# Patient Record
Sex: FEMALE | Race: WHITE | Employment: OTHER | URBAN - METROPOLITAN AREA
[De-identification: names, ages, dates, MRNs, and addresses within clinical notes are randomized per-mention and may not be internally consistent; named-entity substitution may affect disease eponyms.]

---

## 2017-01-01 ENCOUNTER — HOSPICE ADMISSION (OUTPATIENT)
Dept: HOSPICE | Facility: HOSPICE | Age: 82
End: 2017-01-01
Payer: MEDICARE

## 2017-01-01 ENCOUNTER — HOSPITAL ENCOUNTER (INPATIENT)
Age: 82
LOS: 4 days | Discharge: ANOTHER HOSPITAL | DRG: 871 | End: 2017-07-11
Attending: EMERGENCY MEDICINE | Admitting: INTERNAL MEDICINE
Payer: MEDICARE

## 2017-01-01 ENCOUNTER — HOSPITAL ENCOUNTER (OUTPATIENT)
Dept: GENERAL RADIOLOGY | Age: 82
Discharge: HOME | End: 2017-01-12
Attending: INTERNAL MEDICINE
Payer: MEDICARE

## 2017-01-01 ENCOUNTER — HOSPITAL ENCOUNTER (INPATIENT)
Age: 82
LOS: 2 days | End: 2017-07-17
Attending: INTERNAL MEDICINE | Admitting: INTERNAL MEDICINE

## 2017-01-01 ENCOUNTER — APPOINTMENT (OUTPATIENT)
Dept: CT IMAGING | Age: 82
DRG: 871 | End: 2017-01-01
Attending: INTERNAL MEDICINE
Payer: MEDICARE

## 2017-01-01 ENCOUNTER — APPOINTMENT (OUTPATIENT)
Dept: GENERAL RADIOLOGY | Age: 82
DRG: 871 | End: 2017-01-01
Attending: EMERGENCY MEDICINE
Payer: MEDICARE

## 2017-01-01 VITALS
HEART RATE: 95 BPM | TEMPERATURE: 98.4 F | BODY MASS INDEX: 29.25 KG/M2 | HEIGHT: 66 IN | DIASTOLIC BLOOD PRESSURE: 88 MMHG | WEIGHT: 182 LBS | SYSTOLIC BLOOD PRESSURE: 140 MMHG | OXYGEN SATURATION: 97 % | RESPIRATION RATE: 18 BRPM

## 2017-01-01 VITALS
DIASTOLIC BLOOD PRESSURE: 85 MMHG | HEART RATE: 163 BPM | RESPIRATION RATE: 26 BRPM | SYSTOLIC BLOOD PRESSURE: 130 MMHG | TEMPERATURE: 99 F

## 2017-01-01 DIAGNOSIS — A41.9 SEPSIS, DUE TO UNSPECIFIED ORGANISM: Primary | ICD-10-CM

## 2017-01-01 DIAGNOSIS — G93.40 ACUTE ENCEPHALOPATHY: ICD-10-CM

## 2017-01-01 DIAGNOSIS — I48.20 CHRONIC ATRIAL FIBRILLATION (HCC): Chronic | ICD-10-CM

## 2017-01-01 DIAGNOSIS — I10 ESSENTIAL HYPERTENSION: Chronic | ICD-10-CM

## 2017-01-01 DIAGNOSIS — N30.00 ACUTE CYSTITIS WITHOUT HEMATURIA: ICD-10-CM

## 2017-01-01 DIAGNOSIS — K21.9 GASTROESOPHAGEAL REFLUX DISEASE WITHOUT ESOPHAGITIS: Chronic | ICD-10-CM

## 2017-01-01 DIAGNOSIS — G47.33 OSA (OBSTRUCTIVE SLEEP APNEA): ICD-10-CM

## 2017-01-01 LAB
ALBUMIN SERPL BCP-MCNC: 2.4 G/DL (ref 3.2–4.6)
ALBUMIN SERPL BCP-MCNC: 2.8 G/DL (ref 3.2–4.6)
ALBUMIN/GLOB SERPL: 0.7 {RATIO} (ref 1.2–3.5)
ALBUMIN/GLOB SERPL: 0.7 {RATIO} (ref 1.2–3.5)
ALP SERPL-CCNC: 100 U/L (ref 50–136)
ALP SERPL-CCNC: 82 U/L (ref 50–136)
ALT SERPL-CCNC: 16 U/L (ref 12–65)
ALT SERPL-CCNC: 18 U/L (ref 12–65)
ANION GAP BLD CALC-SCNC: 11 MMOL/L (ref 7–16)
ANION GAP BLD CALC-SCNC: 11 MMOL/L (ref 7–16)
ANION GAP BLD CALC-SCNC: 8 MMOL/L (ref 7–16)
ANION GAP BLD CALC-SCNC: 9 MMOL/L (ref 7–16)
APPEARANCE UR: ABNORMAL
AST SERPL W P-5'-P-CCNC: 18 U/L (ref 15–37)
AST SERPL W P-5'-P-CCNC: 23 U/L (ref 15–37)
ATRIAL RATE: 416 BPM
BACTERIA SPEC CULT: ABNORMAL
BACTERIA SPEC CULT: NORMAL
BACTERIA SPEC CULT: NORMAL
BACTERIA URNS QL MICRO: ABNORMAL /HPF
BASOPHILS # BLD AUTO: 0 K/UL (ref 0–0.2)
BASOPHILS # BLD AUTO: 0 K/UL (ref 0–0.2)
BASOPHILS # BLD: 0 % (ref 0–2)
BASOPHILS # BLD: 0 % (ref 0–2)
BILIRUB SERPL-MCNC: 1 MG/DL (ref 0.2–1.1)
BILIRUB SERPL-MCNC: 1.5 MG/DL (ref 0.2–1.1)
BILIRUB UR QL: NEGATIVE
BNP SERPL-MCNC: 196 PG/ML
BUN SERPL-MCNC: 13 MG/DL (ref 8–23)
BUN SERPL-MCNC: 15 MG/DL (ref 8–23)
BUN SERPL-MCNC: 21 MG/DL (ref 8–23)
BUN SERPL-MCNC: 22 MG/DL (ref 8–23)
CALCIUM SERPL-MCNC: 7.8 MG/DL (ref 8.3–10.4)
CALCIUM SERPL-MCNC: 8.3 MG/DL (ref 8.3–10.4)
CALCIUM SERPL-MCNC: 8.5 MG/DL (ref 8.3–10.4)
CALCIUM SERPL-MCNC: 8.7 MG/DL (ref 8.3–10.4)
CALCULATED R AXIS, ECG10: 7 DEGREES
CALCULATED T AXIS, ECG11: -159 DEGREES
CASTS URNS QL MICRO: ABNORMAL /LPF
CHLORIDE SERPL-SCNC: 107 MMOL/L (ref 98–107)
CHLORIDE SERPL-SCNC: 108 MMOL/L (ref 98–107)
CHLORIDE SERPL-SCNC: 110 MMOL/L (ref 98–107)
CHLORIDE SERPL-SCNC: 111 MMOL/L (ref 98–107)
CK MB CFR SERPL CALC: 1.4 %
CK MB SERPL-MCNC: 1.5 NG/ML (ref 0.5–3.6)
CK SERPL-CCNC: 107 U/L (ref 21–215)
CO2 SERPL-SCNC: 23 MMOL/L (ref 21–32)
CO2 SERPL-SCNC: 25 MMOL/L (ref 21–32)
CO2 SERPL-SCNC: 26 MMOL/L (ref 21–32)
CO2 SERPL-SCNC: 27 MMOL/L (ref 21–32)
COLOR UR: YELLOW
CREAT SERPL-MCNC: 0.89 MG/DL (ref 0.6–1)
CREAT SERPL-MCNC: 0.98 MG/DL (ref 0.6–1)
CREAT SERPL-MCNC: 1.07 MG/DL (ref 0.6–1)
CREAT SERPL-MCNC: 1.15 MG/DL (ref 0.6–1)
DIAGNOSIS, 93000: NORMAL
DIFFERENTIAL METHOD BLD: ABNORMAL
DIFFERENTIAL METHOD BLD: ABNORMAL
EOSINOPHIL # BLD: 0 K/UL (ref 0–0.8)
EOSINOPHIL # BLD: 0.1 K/UL (ref 0–0.8)
EOSINOPHIL NFR BLD: 0 % (ref 0.5–7.8)
EOSINOPHIL NFR BLD: 1 % (ref 0.5–7.8)
EPI CELLS #/AREA URNS HPF: 0 /HPF
ERYTHROCYTE [DISTWIDTH] IN BLOOD BY AUTOMATED COUNT: 14.4 % (ref 11.9–14.6)
ERYTHROCYTE [DISTWIDTH] IN BLOOD BY AUTOMATED COUNT: 15 % (ref 11.9–14.6)
ERYTHROCYTE [DISTWIDTH] IN BLOOD BY AUTOMATED COUNT: 15.4 % (ref 11.9–14.6)
ERYTHROCYTE [DISTWIDTH] IN BLOOD BY AUTOMATED COUNT: 15.4 % (ref 11.9–14.6)
GLOBULIN SER CALC-MCNC: 3.6 G/DL (ref 2.3–3.5)
GLOBULIN SER CALC-MCNC: 3.9 G/DL (ref 2.3–3.5)
GLUCOSE BLD STRIP.AUTO-MCNC: 155 MG/DL (ref 65–100)
GLUCOSE SERPL-MCNC: 109 MG/DL (ref 65–100)
GLUCOSE SERPL-MCNC: 114 MG/DL (ref 65–100)
GLUCOSE SERPL-MCNC: 129 MG/DL (ref 65–100)
GLUCOSE SERPL-MCNC: 150 MG/DL (ref 65–100)
GLUCOSE UR STRIP.AUTO-MCNC: NEGATIVE MG/DL
HCT VFR BLD AUTO: 43 % (ref 35.8–46.3)
HCT VFR BLD AUTO: 45.1 % (ref 35.8–46.3)
HCT VFR BLD AUTO: 45.5 % (ref 35.8–46.3)
HCT VFR BLD AUTO: 46.2 % (ref 35.8–46.3)
HGB BLD-MCNC: 13.6 G/DL (ref 11.7–15.4)
HGB BLD-MCNC: 15 G/DL (ref 11.7–15.4)
HGB BLD-MCNC: 15.2 G/DL (ref 11.7–15.4)
HGB BLD-MCNC: 15.6 G/DL (ref 11.7–15.4)
HGB UR QL STRIP: ABNORMAL
IMM GRANULOCYTES # BLD: 0 K/UL (ref 0–0.5)
IMM GRANULOCYTES # BLD: 0.1 K/UL (ref 0–0.5)
IMM GRANULOCYTES NFR BLD AUTO: 0.3 % (ref 0–5)
IMM GRANULOCYTES NFR BLD AUTO: 0.6 % (ref 0–5)
KETONES UR QL STRIP.AUTO: NEGATIVE MG/DL
LACTATE BLD-SCNC: 1.7 MMOL/L (ref 0.5–1.9)
LEUKOCYTE ESTERASE UR QL STRIP.AUTO: ABNORMAL
LYMPHOCYTES # BLD AUTO: 5 % (ref 13–44)
LYMPHOCYTES # BLD AUTO: 9 % (ref 13–44)
LYMPHOCYTES # BLD: 0.7 K/UL (ref 0.5–4.6)
LYMPHOCYTES # BLD: 0.9 K/UL (ref 0.5–4.6)
MAGNESIUM SERPL-MCNC: 1.8 MG/DL (ref 1.8–2.4)
MCH RBC QN AUTO: 31.6 PG (ref 26.1–32.9)
MCH RBC QN AUTO: 32.1 PG (ref 26.1–32.9)
MCH RBC QN AUTO: 32.3 PG (ref 26.1–32.9)
MCH RBC QN AUTO: 32.6 PG (ref 26.1–32.9)
MCHC RBC AUTO-ENTMCNC: 31.6 G/DL (ref 31.4–35)
MCHC RBC AUTO-ENTMCNC: 33 G/DL (ref 31.4–35)
MCHC RBC AUTO-ENTMCNC: 33.7 G/DL (ref 31.4–35)
MCHC RBC AUTO-ENTMCNC: 33.8 G/DL (ref 31.4–35)
MCV RBC AUTO: 96 FL (ref 79.6–97.8)
MCV RBC AUTO: 96.5 FL (ref 79.6–97.8)
MCV RBC AUTO: 97.2 FL (ref 79.6–97.8)
MCV RBC AUTO: 99.8 FL (ref 79.6–97.8)
MM INDURATION POC: 0 MM (ref 0–5)
MM INDURATION POC: NORMAL MM (ref 0–5)
MM INDURATION POC: NORMAL MM (ref 0–5)
MONOCYTES # BLD: 0.9 K/UL (ref 0.1–1.3)
MONOCYTES # BLD: 1.7 K/UL (ref 0.1–1.3)
MONOCYTES NFR BLD AUTO: 14 % (ref 4–12)
MONOCYTES NFR BLD AUTO: 9 % (ref 4–12)
NEUTS SEG # BLD: 10 K/UL (ref 1.7–8.2)
NEUTS SEG # BLD: 8.1 K/UL (ref 1.7–8.2)
NEUTS SEG NFR BLD AUTO: 80 % (ref 43–78)
NEUTS SEG NFR BLD AUTO: 81 % (ref 43–78)
NITRITE UR QL STRIP.AUTO: NEGATIVE
PH UR STRIP: 5.5 [PH] (ref 5–9)
PLATELET # BLD AUTO: 106 K/UL (ref 150–450)
PLATELET # BLD AUTO: 111 K/UL (ref 150–450)
PLATELET # BLD AUTO: 114 K/UL (ref 150–450)
PLATELET # BLD AUTO: 119 K/UL (ref 150–450)
PMV BLD AUTO: 11.4 FL (ref 10.8–14.1)
PMV BLD AUTO: 12 FL (ref 10.8–14.1)
PMV BLD AUTO: 12 FL (ref 10.8–14.1)
PMV BLD AUTO: 12.3 FL (ref 10.8–14.1)
POTASSIUM SERPL-SCNC: 3.5 MMOL/L (ref 3.5–5.1)
POTASSIUM SERPL-SCNC: 3.9 MMOL/L (ref 3.5–5.1)
POTASSIUM SERPL-SCNC: 4.1 MMOL/L (ref 3.5–5.1)
POTASSIUM SERPL-SCNC: 4.5 MMOL/L (ref 3.5–5.1)
PPD POC: NEGATIVE NEGATIVE
PPD POC: NORMAL NEGATIVE
PPD POC: NORMAL NEGATIVE
PROCALCITONIN SERPL-MCNC: 0.5 NG/ML
PROT SERPL-MCNC: 6 G/DL (ref 6.3–8.2)
PROT SERPL-MCNC: 6.7 G/DL (ref 6.3–8.2)
PROT UR STRIP-MCNC: 100 MG/DL
Q-T INTERVAL, ECG07: 308 MS
QRS DURATION, ECG06: 78 MS
QTC CALCULATION (BEZET), ECG08: 446 MS
RBC # BLD AUTO: 4.31 M/UL (ref 4.05–5.25)
RBC # BLD AUTO: 4.68 M/UL (ref 4.05–5.25)
RBC # BLD AUTO: 4.7 M/UL (ref 4.05–5.25)
RBC # BLD AUTO: 4.79 M/UL (ref 4.05–5.25)
RBC #/AREA URNS HPF: ABNORMAL /HPF
SERVICE CMNT-IMP: ABNORMAL
SERVICE CMNT-IMP: NORMAL
SERVICE CMNT-IMP: NORMAL
SODIUM SERPL-SCNC: 142 MMOL/L (ref 136–145)
SODIUM SERPL-SCNC: 143 MMOL/L (ref 136–145)
SODIUM SERPL-SCNC: 145 MMOL/L (ref 136–145)
SODIUM SERPL-SCNC: 146 MMOL/L (ref 136–145)
SP GR UR REFRACTOMETRY: 1.01 (ref 1–1.02)
TROPONIN I BLD-MCNC: 0.03 NG/ML (ref 0–0.08)
TROPONIN I SERPL-MCNC: <0.02 NG/ML (ref 0.02–0.05)
UROBILINOGEN UR QL STRIP.AUTO: 0.2 EU/DL (ref 0.2–1)
VENTRICULAR RATE, ECG03: 126 BPM
WBC # BLD AUTO: 10 K/UL (ref 4.3–11.1)
WBC # BLD AUTO: 12.6 K/UL (ref 4.3–11.1)
WBC # BLD AUTO: 13.1 K/UL (ref 4.3–11.1)
WBC # BLD AUTO: 6.3 K/UL (ref 4.3–11.1)
WBC URNS QL MICRO: >100 /HPF

## 2017-01-01 PROCEDURE — 74011250637 HC RX REV CODE- 250/637: Performed by: INTERNAL MEDICINE

## 2017-01-01 PROCEDURE — 81001 URINALYSIS AUTO W/SCOPE: CPT | Performed by: EMERGENCY MEDICINE

## 2017-01-01 PROCEDURE — 74011000258 HC RX REV CODE- 258: Performed by: INTERNAL MEDICINE

## 2017-01-01 PROCEDURE — 85025 COMPLETE CBC W/AUTO DIFF WBC: CPT | Performed by: INTERNAL MEDICINE

## 2017-01-01 PROCEDURE — 74011250637 HC RX REV CODE- 250/637

## 2017-01-01 PROCEDURE — 70496 CT ANGIOGRAPHY HEAD: CPT

## 2017-01-01 PROCEDURE — 36415 COLL VENOUS BLD VENIPUNCTURE: CPT | Performed by: INTERNAL MEDICINE

## 2017-01-01 PROCEDURE — 74011250637 HC RX REV CODE- 250/637: Performed by: EMERGENCY MEDICINE

## 2017-01-01 PROCEDURE — 99285 EMERGENCY DEPT VISIT HI MDM: CPT | Performed by: EMERGENCY MEDICINE

## 2017-01-01 PROCEDURE — 70450 CT HEAD/BRAIN W/O DYE: CPT

## 2017-01-01 PROCEDURE — 84145 PROCALCITONIN (PCT): CPT | Performed by: EMERGENCY MEDICINE

## 2017-01-01 PROCEDURE — 93306 TTE W/DOPPLER COMPLETE: CPT

## 2017-01-01 PROCEDURE — 74011250636 HC RX REV CODE- 250/636: Performed by: INTERNAL MEDICINE

## 2017-01-01 PROCEDURE — 80048 BASIC METABOLIC PNL TOTAL CA: CPT | Performed by: INTERNAL MEDICINE

## 2017-01-01 PROCEDURE — 65660000000 HC RM CCU STEPDOWN

## 2017-01-01 PROCEDURE — 74011000258 HC RX REV CODE- 258: Performed by: EMERGENCY MEDICINE

## 2017-01-01 PROCEDURE — 93005 ELECTROCARDIOGRAM TRACING: CPT | Performed by: EMERGENCY MEDICINE

## 2017-01-01 PROCEDURE — 97530 THERAPEUTIC ACTIVITIES: CPT

## 2017-01-01 PROCEDURE — 80053 COMPREHEN METABOLIC PANEL: CPT | Performed by: EMERGENCY MEDICINE

## 2017-01-01 PROCEDURE — 74011000302 HC RX REV CODE- 302

## 2017-01-01 PROCEDURE — 87088 URINE BACTERIA CULTURE: CPT | Performed by: EMERGENCY MEDICINE

## 2017-01-01 PROCEDURE — 74011250636 HC RX REV CODE- 250/636: Performed by: NURSE PRACTITIONER

## 2017-01-01 PROCEDURE — 80053 COMPREHEN METABOLIC PANEL: CPT | Performed by: INTERNAL MEDICINE

## 2017-01-01 PROCEDURE — 87086 URINE CULTURE/COLONY COUNT: CPT | Performed by: EMERGENCY MEDICINE

## 2017-01-01 PROCEDURE — 3336500001 HSPC ELECTION

## 2017-01-01 PROCEDURE — 83605 ASSAY OF LACTIC ACID: CPT

## 2017-01-01 PROCEDURE — 0656 HSPC GENERAL INPATIENT

## 2017-01-01 PROCEDURE — 74011250637 HC RX REV CODE- 250/637: Performed by: HOSPITALIST

## 2017-01-01 PROCEDURE — 87186 SC STD MICRODIL/AGAR DIL: CPT | Performed by: EMERGENCY MEDICINE

## 2017-01-01 PROCEDURE — 74011000250 HC RX REV CODE- 250: Performed by: EMERGENCY MEDICINE

## 2017-01-01 PROCEDURE — 97161 PT EVAL LOW COMPLEX 20 MIN: CPT

## 2017-01-01 PROCEDURE — 74011000250 HC RX REV CODE- 250: Performed by: NURSE PRACTITIONER

## 2017-01-01 PROCEDURE — 71020 XR CHEST PA LAT: CPT

## 2017-01-01 PROCEDURE — 77030033269 HC SLV COMPR SCD KNE2 CARD -B

## 2017-01-01 PROCEDURE — 96374 THER/PROPH/DIAG INJ IV PUSH: CPT | Performed by: EMERGENCY MEDICINE

## 2017-01-01 PROCEDURE — 83880 ASSAY OF NATRIURETIC PEPTIDE: CPT | Performed by: EMERGENCY MEDICINE

## 2017-01-01 PROCEDURE — 74011250636 HC RX REV CODE- 250/636

## 2017-01-01 PROCEDURE — 84484 ASSAY OF TROPONIN QUANT: CPT | Performed by: INTERNAL MEDICINE

## 2017-01-01 PROCEDURE — 96361 HYDRATE IV INFUSION ADD-ON: CPT | Performed by: EMERGENCY MEDICINE

## 2017-01-01 PROCEDURE — 82962 GLUCOSE BLOOD TEST: CPT

## 2017-01-01 PROCEDURE — 82550 ASSAY OF CK (CPK): CPT | Performed by: INTERNAL MEDICINE

## 2017-01-01 PROCEDURE — 83735 ASSAY OF MAGNESIUM: CPT | Performed by: INTERNAL MEDICINE

## 2017-01-01 PROCEDURE — 85025 COMPLETE CBC W/AUTO DIFF WBC: CPT | Performed by: EMERGENCY MEDICINE

## 2017-01-01 PROCEDURE — 86580 TB INTRADERMAL TEST: CPT

## 2017-01-01 PROCEDURE — 84484 ASSAY OF TROPONIN QUANT: CPT

## 2017-01-01 PROCEDURE — 97165 OT EVAL LOW COMPLEX 30 MIN: CPT

## 2017-01-01 PROCEDURE — 85027 COMPLETE CBC AUTOMATED: CPT | Performed by: INTERNAL MEDICINE

## 2017-01-01 PROCEDURE — 74011250636 HC RX REV CODE- 250/636: Performed by: EMERGENCY MEDICINE

## 2017-01-01 PROCEDURE — 87040 BLOOD CULTURE FOR BACTERIA: CPT | Performed by: EMERGENCY MEDICINE

## 2017-01-01 PROCEDURE — 71010 XR CHEST PORT: CPT

## 2017-01-01 PROCEDURE — 74011636320 HC RX REV CODE- 636/320: Performed by: INTERNAL MEDICINE

## 2017-01-01 RX ORDER — SODIUM CHLORIDE 0.9 % (FLUSH) 0.9 %
3 SYRINGE (ML) INJECTION EVERY 12 HOURS
Status: DISCONTINUED | OUTPATIENT
Start: 2017-01-01 | End: 2017-01-01

## 2017-01-01 RX ORDER — SODIUM CHLORIDE 0.9 % (FLUSH) 0.9 %
5-10 SYRINGE (ML) INJECTION AS NEEDED
Status: DISCONTINUED | OUTPATIENT
Start: 2017-01-01 | End: 2017-01-01 | Stop reason: HOSPADM

## 2017-01-01 RX ORDER — NAPROXEN SODIUM 220 MG
220 TABLET ORAL 2 TIMES DAILY WITH MEALS
COMMUNITY

## 2017-01-01 RX ORDER — ACETAMINOPHEN 325 MG/1
650 TABLET ORAL
COMMUNITY

## 2017-01-01 RX ORDER — SODIUM CHLORIDE 9 MG/ML
75 INJECTION, SOLUTION INTRAVENOUS CONTINUOUS
Status: DISCONTINUED | OUTPATIENT
Start: 2017-01-01 | End: 2017-01-01

## 2017-01-01 RX ORDER — ACETAMINOPHEN 500 MG
1000 TABLET ORAL
Status: COMPLETED | OUTPATIENT
Start: 2017-01-01 | End: 2017-01-01

## 2017-01-01 RX ORDER — LEVOTHYROXINE SODIUM 125 UG/1
125 TABLET ORAL
Status: DISCONTINUED | OUTPATIENT
Start: 2017-01-01 | End: 2017-01-01 | Stop reason: HOSPADM

## 2017-01-01 RX ORDER — LORAZEPAM 2 MG/ML
1 INJECTION INTRAMUSCULAR
Status: DISCONTINUED | OUTPATIENT
Start: 2017-01-01 | End: 2017-01-01

## 2017-01-01 RX ORDER — MULTIVITAMIN
1 TABLET ORAL DAILY
COMMUNITY

## 2017-01-01 RX ORDER — FACIAL-BODY WIPES
10 EACH TOPICAL AS NEEDED
Status: DISCONTINUED | OUTPATIENT
Start: 2017-01-01 | End: 2017-01-01 | Stop reason: HOSPADM

## 2017-01-01 RX ORDER — HEPARIN SODIUM 5000 [USP'U]/ML
5000 INJECTION, SOLUTION INTRAVENOUS; SUBCUTANEOUS EVERY 8 HOURS
Status: CANCELLED | OUTPATIENT
Start: 2017-01-01

## 2017-01-01 RX ORDER — DEXTROMETHORPHAN HYDROBROMIDE, GUAIFENESIN 5; 100 MG/5ML; MG/5ML
LIQUID ORAL 3 TIMES DAILY
COMMUNITY
End: 2017-01-01

## 2017-01-01 RX ORDER — SODIUM CHLORIDE 0.9 % (FLUSH) 0.9 %
10 SYRINGE (ML) INJECTION
Status: COMPLETED | OUTPATIENT
Start: 2017-01-01 | End: 2017-01-01

## 2017-01-01 RX ORDER — SODIUM CHLORIDE 0.9 % (FLUSH) 0.9 %
3 SYRINGE (ML) INJECTION AS NEEDED
Status: DISCONTINUED | OUTPATIENT
Start: 2017-01-01 | End: 2017-01-01

## 2017-01-01 RX ORDER — MORPHINE SULFATE 2 MG/ML
2 INJECTION, SOLUTION INTRAMUSCULAR; INTRAVENOUS
Status: DISCONTINUED | OUTPATIENT
Start: 2017-01-01 | End: 2017-01-01 | Stop reason: HOSPADM

## 2017-01-01 RX ORDER — ASCORBIC ACID 500 MG
1000 TABLET ORAL DAILY
Status: DISCONTINUED | OUTPATIENT
Start: 2017-01-01 | End: 2017-01-01 | Stop reason: HOSPADM

## 2017-01-01 RX ORDER — NITROFURANTOIN MACROCRYSTALS 50 MG/1
50 CAPSULE ORAL EVERY 6 HOURS
Status: DISCONTINUED | OUTPATIENT
Start: 2017-01-01 | End: 2017-01-01 | Stop reason: HOSPADM

## 2017-01-01 RX ORDER — METOPROLOL SUCCINATE 25 MG/1
25 TABLET, EXTENDED RELEASE ORAL DAILY
COMMUNITY

## 2017-01-01 RX ORDER — METOPROLOL SUCCINATE 50 MG/1
50 TABLET, EXTENDED RELEASE ORAL DAILY
Status: DISCONTINUED | OUTPATIENT
Start: 2017-01-01 | End: 2017-01-01 | Stop reason: HOSPADM

## 2017-01-01 RX ORDER — MORPHINE SULFATE 2 MG/ML
2 INJECTION, SOLUTION INTRAMUSCULAR; INTRAVENOUS
Status: DISCONTINUED | OUTPATIENT
Start: 2017-01-01 | End: 2017-01-01

## 2017-01-01 RX ORDER — MEMANTINE HYDROCHLORIDE 5 MG/1
10 TABLET ORAL DAILY
Status: DISCONTINUED | OUTPATIENT
Start: 2017-01-01 | End: 2017-01-01 | Stop reason: HOSPADM

## 2017-01-01 RX ORDER — HALOPERIDOL 5 MG/ML
2 INJECTION INTRAMUSCULAR
Status: DISCONTINUED | OUTPATIENT
Start: 2017-01-01 | End: 2017-01-01

## 2017-01-01 RX ORDER — LORAZEPAM 2 MG/ML
1 INJECTION INTRAMUSCULAR
Status: DISCONTINUED | OUTPATIENT
Start: 2017-01-01 | End: 2017-01-01 | Stop reason: HOSPADM

## 2017-01-01 RX ORDER — IBUPROFEN 800 MG/1
800 TABLET ORAL
COMMUNITY

## 2017-01-01 RX ORDER — IPRATROPIUM BROMIDE AND ALBUTEROL SULFATE 2.5; .5 MG/3ML; MG/3ML
3 SOLUTION RESPIRATORY (INHALATION)
Status: DISCONTINUED | OUTPATIENT
Start: 2017-01-01 | End: 2017-01-01 | Stop reason: HOSPADM

## 2017-01-01 RX ORDER — ACETAMINOPHEN 325 MG/1
325 TABLET ORAL
Status: DISCONTINUED | OUTPATIENT
Start: 2017-01-01 | End: 2017-01-01 | Stop reason: HOSPADM

## 2017-01-01 RX ORDER — LEVOTHYROXINE SODIUM 100 UG/1
100 TABLET ORAL
COMMUNITY

## 2017-01-01 RX ORDER — HALOPERIDOL 5 MG/ML
2 INJECTION INTRAMUSCULAR
Status: DISCONTINUED | OUTPATIENT
Start: 2017-01-01 | End: 2017-01-01 | Stop reason: HOSPADM

## 2017-01-01 RX ORDER — DILTIAZEM HYDROCHLORIDE 5 MG/ML
5 INJECTION INTRAVENOUS ONCE
Status: COMPLETED | OUTPATIENT
Start: 2017-01-01 | End: 2017-01-01

## 2017-01-01 RX ORDER — SODIUM CHLORIDE 9 MG/ML
150 INJECTION, SOLUTION INTRAVENOUS CONTINUOUS
Status: DISCONTINUED | OUTPATIENT
Start: 2017-01-01 | End: 2017-01-01 | Stop reason: HOSPADM

## 2017-01-01 RX ORDER — GLYCOPYRROLATE 0.2 MG/ML
0.2 INJECTION INTRAMUSCULAR; INTRAVENOUS
Status: DISCONTINUED | OUTPATIENT
Start: 2017-01-01 | End: 2017-01-01 | Stop reason: HOSPADM

## 2017-01-01 RX ORDER — FERROUS GLUCONATE 324(38)MG
1 TABLET ORAL DAILY
Status: DISCONTINUED | OUTPATIENT
Start: 2017-01-01 | End: 2017-01-01 | Stop reason: HOSPADM

## 2017-01-01 RX ORDER — SODIUM CHLORIDE 0.9 % (FLUSH) 0.9 %
5 SYRINGE (ML) INJECTION EVERY 8 HOURS
Status: DISCONTINUED | OUTPATIENT
Start: 2017-01-01 | End: 2017-01-01 | Stop reason: HOSPADM

## 2017-01-01 RX ORDER — LORAZEPAM 2 MG/ML
2 INJECTION INTRAMUSCULAR
Status: DISCONTINUED | OUTPATIENT
Start: 2017-01-01 | End: 2017-01-01 | Stop reason: HOSPADM

## 2017-01-01 RX ORDER — ACETAMINOPHEN 650 MG/1
650 SUPPOSITORY RECTAL
Status: DISCONTINUED | OUTPATIENT
Start: 2017-01-01 | End: 2017-01-01 | Stop reason: HOSPADM

## 2017-01-01 RX ORDER — DIPHENOXYLATE HYDROCHLORIDE AND ATROPINE SULFATE 2.5; .025 MG/1; MG/1
1 TABLET ORAL
COMMUNITY

## 2017-01-01 RX ORDER — DILTIAZEM HYDROCHLORIDE 120 MG/1
120 CAPSULE, COATED, EXTENDED RELEASE ORAL DAILY
COMMUNITY

## 2017-01-01 RX ORDER — DOCUSATE SODIUM 100 MG/1
100 CAPSULE, LIQUID FILLED ORAL 2 TIMES DAILY
Status: DISCONTINUED | OUTPATIENT
Start: 2017-01-01 | End: 2017-01-01 | Stop reason: HOSPADM

## 2017-01-01 RX ORDER — LORAZEPAM 2 MG/ML
2 INJECTION INTRAMUSCULAR
Status: DISCONTINUED | OUTPATIENT
Start: 2017-01-01 | End: 2017-01-01

## 2017-01-01 RX ORDER — METOPROLOL SUCCINATE 25 MG/1
25 TABLET, EXTENDED RELEASE ORAL DAILY
Status: DISCONTINUED | OUTPATIENT
Start: 2017-01-01 | End: 2017-01-01

## 2017-01-01 RX ADMIN — FERROUS GLUCONATE 1 TABLET: 324 TABLET ORAL at 09:40

## 2017-01-01 RX ADMIN — OXYCODONE HYDROCHLORIDE AND ACETAMINOPHEN 1000 MG: 500 TABLET ORAL at 09:35

## 2017-01-01 RX ADMIN — HALOPERIDOL LACTATE 2 MG: 5 INJECTION, SOLUTION INTRAMUSCULAR at 09:51

## 2017-01-01 RX ADMIN — SODIUM CHLORIDE 75 ML/HR: 900 INJECTION, SOLUTION INTRAVENOUS at 00:00

## 2017-01-01 RX ADMIN — Medication 5 ML: at 04:58

## 2017-01-01 RX ADMIN — SODIUM CHLORIDE 125 ML/HR: 900 INJECTION, SOLUTION INTRAVENOUS at 05:45

## 2017-01-01 RX ADMIN — MORPHINE SULFATE 2 MG: 2 INJECTION, SOLUTION INTRAMUSCULAR; INTRAVENOUS at 20:29

## 2017-01-01 RX ADMIN — SODIUM CHLORIDE 150 ML/HR: 900 INJECTION, SOLUTION INTRAVENOUS at 10:53

## 2017-01-01 RX ADMIN — MORPHINE SULFATE 2 MG: 2 INJECTION, SOLUTION INTRAMUSCULAR; INTRAVENOUS at 08:07

## 2017-01-01 RX ADMIN — MORPHINE SULFATE 2 MG: 2 INJECTION, SOLUTION INTRAMUSCULAR; INTRAVENOUS at 15:14

## 2017-01-01 RX ADMIN — DOCUSATE SODIUM 100 MG: 100 CAPSULE, LIQUID FILLED ORAL at 21:25

## 2017-01-01 RX ADMIN — METOPROLOL SUCCINATE 50 MG: 50 TABLET, EXTENDED RELEASE ORAL at 07:54

## 2017-01-01 RX ADMIN — DOCUSATE SODIUM 100 MG: 100 CAPSULE, LIQUID FILLED ORAL at 07:55

## 2017-01-01 RX ADMIN — SODIUM CHLORIDE 100 ML: 900 INJECTION, SOLUTION INTRAVENOUS at 11:30

## 2017-01-01 RX ADMIN — MEMANTINE HYDROCHLORIDE 10 MG: 5 TABLET ORAL at 07:55

## 2017-01-01 RX ADMIN — SODIUM CHLORIDE, PRESERVATIVE FREE 3 ML: 5 INJECTION INTRAVENOUS at 11:36

## 2017-01-01 RX ADMIN — NITROFURANTOIN MACROCRYSTALS 50 MG: 50 CAPSULE ORAL at 17:53

## 2017-01-01 RX ADMIN — Medication 10 ML: at 11:30

## 2017-01-01 RX ADMIN — Medication 5 ML: at 13:10

## 2017-01-01 RX ADMIN — MEROPENEM 500 MG: 500 INJECTION, POWDER, FOR SOLUTION INTRAVENOUS at 21:24

## 2017-01-01 RX ADMIN — MORPHINE SULFATE 2 MG: 2 INJECTION, SOLUTION INTRAMUSCULAR; INTRAVENOUS at 09:52

## 2017-01-01 RX ADMIN — Medication 5 ML: at 21:35

## 2017-01-01 RX ADMIN — MEROPENEM 500 MG: 500 INJECTION, POWDER, FOR SOLUTION INTRAVENOUS at 20:03

## 2017-01-01 RX ADMIN — LORAZEPAM 1 MG: 2 INJECTION INTRAMUSCULAR; INTRAVENOUS at 18:09

## 2017-01-01 RX ADMIN — HALOPERIDOL LACTATE 2 MG: 5 INJECTION, SOLUTION INTRAMUSCULAR at 16:47

## 2017-01-01 RX ADMIN — LORAZEPAM 1 MG: 2 INJECTION INTRAMUSCULAR; INTRAVENOUS at 15:15

## 2017-01-01 RX ADMIN — Medication 5 ML: at 17:54

## 2017-01-01 RX ADMIN — FERROUS GLUCONATE 1 TABLET: 324 TABLET ORAL at 07:55

## 2017-01-01 RX ADMIN — MORPHINE SULFATE 2 MG: 2 INJECTION, SOLUTION INTRAMUSCULAR; INTRAVENOUS at 06:17

## 2017-01-01 RX ADMIN — NITROFURANTOIN MACROCRYSTALS 50 MG: 50 CAPSULE ORAL at 15:31

## 2017-01-01 RX ADMIN — MEROPENEM 500 MG: 500 INJECTION, POWDER, FOR SOLUTION INTRAVENOUS at 13:59

## 2017-01-01 RX ADMIN — MORPHINE SULFATE 2 MG: 2 INJECTION, SOLUTION INTRAMUSCULAR; INTRAVENOUS at 12:26

## 2017-01-01 RX ADMIN — MEMANTINE HYDROCHLORIDE 10 MG: 5 TABLET ORAL at 09:37

## 2017-01-01 RX ADMIN — MORPHINE SULFATE 2 MG: 2 INJECTION, SOLUTION INTRAMUSCULAR; INTRAVENOUS at 18:07

## 2017-01-01 RX ADMIN — DILTIAZEM HYDROCHLORIDE 5 MG: 5 INJECTION INTRAVENOUS at 16:29

## 2017-01-01 RX ADMIN — DOCUSATE SODIUM 100 MG: 100 CAPSULE, LIQUID FILLED ORAL at 09:37

## 2017-01-01 RX ADMIN — SODIUM CHLORIDE 1000 ML: 900 INJECTION, SOLUTION INTRAVENOUS at 15:57

## 2017-01-01 RX ADMIN — MORPHINE SULFATE 2 MG: 2 INJECTION, SOLUTION INTRAMUSCULAR; INTRAVENOUS at 00:22

## 2017-01-01 RX ADMIN — HALOPERIDOL LACTATE 2 MG: 5 INJECTION, SOLUTION INTRAMUSCULAR at 17:47

## 2017-01-01 RX ADMIN — MORPHINE SULFATE 2 MG: 2 INJECTION, SOLUTION INTRAMUSCULAR; INTRAVENOUS at 14:53

## 2017-01-01 RX ADMIN — MORPHINE SULFATE 2 MG: 2 INJECTION, SOLUTION INTRAMUSCULAR; INTRAVENOUS at 16:47

## 2017-01-01 RX ADMIN — MORPHINE SULFATE 2 MG: 2 INJECTION, SOLUTION INTRAMUSCULAR; INTRAVENOUS at 14:17

## 2017-01-01 RX ADMIN — MORPHINE SULFATE 2 MG: 2 INJECTION, SOLUTION INTRAMUSCULAR; INTRAVENOUS at 18:12

## 2017-01-01 RX ADMIN — DOCUSATE SODIUM 100 MG: 100 CAPSULE, LIQUID FILLED ORAL at 17:53

## 2017-01-01 RX ADMIN — MORPHINE SULFATE 2 MG: 2 INJECTION, SOLUTION INTRAMUSCULAR; INTRAVENOUS at 21:02

## 2017-01-01 RX ADMIN — GLYCOPYRROLATE 0.2 MG: 0.2 INJECTION INTRAMUSCULAR; INTRAVENOUS at 14:53

## 2017-01-01 RX ADMIN — LEVOTHYROXINE SODIUM 125 MCG: 125 TABLET ORAL at 05:18

## 2017-01-01 RX ADMIN — METOPROLOL SUCCINATE 50 MG: 50 TABLET, EXTENDED RELEASE ORAL at 05:51

## 2017-01-01 RX ADMIN — TOBRAMYCIN SULFATE 340 MG: 40 INJECTION, SOLUTION INTRAMUSCULAR; INTRAVENOUS at 18:00

## 2017-01-01 RX ADMIN — NITROFURANTOIN MACROCRYSTALS 50 MG: 50 CAPSULE ORAL at 05:17

## 2017-01-01 RX ADMIN — HALOPERIDOL LACTATE 2 MG: 5 INJECTION, SOLUTION INTRAMUSCULAR at 14:53

## 2017-01-01 RX ADMIN — MORPHINE SULFATE 2 MG: 2 INJECTION, SOLUTION INTRAMUSCULAR; INTRAVENOUS at 17:47

## 2017-01-01 RX ADMIN — METOPROLOL SUCCINATE 25 MG: 25 TABLET, EXTENDED RELEASE ORAL at 09:40

## 2017-01-01 RX ADMIN — Medication 5 ML: at 05:44

## 2017-01-01 RX ADMIN — ACETAMINOPHEN 1000 MG: 500 TABLET ORAL at 16:33

## 2017-01-01 RX ADMIN — Medication 5 ML: at 05:52

## 2017-01-01 RX ADMIN — Medication 5 ML: at 09:40

## 2017-01-01 RX ADMIN — Medication 5 ML: at 20:59

## 2017-01-01 RX ADMIN — Medication 5 ML: at 21:55

## 2017-01-01 RX ADMIN — TUBERCULIN PURIFIED PROTEIN DERIVATIVE 5 UNITS: 5 INJECTION, SOLUTION INTRADERMAL at 21:25

## 2017-01-01 RX ADMIN — MORPHINE SULFATE 2 MG: 2 INJECTION, SOLUTION INTRAMUSCULAR; INTRAVENOUS at 18:36

## 2017-01-01 RX ADMIN — HALOPERIDOL LACTATE 2 MG: 5 INJECTION, SOLUTION INTRAMUSCULAR at 08:06

## 2017-01-01 RX ADMIN — LEVOTHYROXINE SODIUM 125 MCG: 125 TABLET ORAL at 04:59

## 2017-01-01 RX ADMIN — FERROUS GLUCONATE 1 TABLET: 324 TABLET ORAL at 09:37

## 2017-01-01 RX ADMIN — MORPHINE SULFATE 2 MG: 2 INJECTION, SOLUTION INTRAMUSCULAR; INTRAVENOUS at 21:35

## 2017-01-01 RX ADMIN — MEROPENEM 500 MG: 500 INJECTION, POWDER, FOR SOLUTION INTRAVENOUS at 04:21

## 2017-01-01 RX ADMIN — NITROFURANTOIN MACROCRYSTALS 50 MG: 50 CAPSULE ORAL at 00:54

## 2017-01-01 RX ADMIN — MORPHINE SULFATE 2 MG: 2 INJECTION, SOLUTION INTRAMUSCULAR; INTRAVENOUS at 02:55

## 2017-01-01 RX ADMIN — NITROFURANTOIN MACROCRYSTALS 50 MG: 50 CAPSULE ORAL at 23:45

## 2017-01-01 RX ADMIN — MORPHINE SULFATE 2 MG: 2 INJECTION, SOLUTION INTRAMUSCULAR; INTRAVENOUS at 07:40

## 2017-01-01 RX ADMIN — OXYCODONE HYDROCHLORIDE AND ACETAMINOPHEN 1000 MG: 500 TABLET ORAL at 09:39

## 2017-01-01 RX ADMIN — MORPHINE SULFATE 2 MG: 2 INJECTION, SOLUTION INTRAMUSCULAR; INTRAVENOUS at 20:06

## 2017-01-01 RX ADMIN — ACETAMINOPHEN 325 MG: 325 TABLET ORAL at 19:56

## 2017-01-01 RX ADMIN — MORPHINE SULFATE 2 MG: 2 INJECTION, SOLUTION INTRAMUSCULAR; INTRAVENOUS at 11:38

## 2017-01-01 RX ADMIN — IOPAMIDOL 80 ML: 755 INJECTION, SOLUTION INTRAVENOUS at 11:30

## 2017-01-01 RX ADMIN — MEMANTINE HYDROCHLORIDE 10 MG: 5 TABLET ORAL at 09:39

## 2017-01-01 RX ADMIN — NITROFURANTOIN MACROCRYSTALS 50 MG: 50 CAPSULE ORAL at 21:55

## 2017-01-01 RX ADMIN — DOCUSATE SODIUM 100 MG: 100 CAPSULE, LIQUID FILLED ORAL at 09:39

## 2017-01-01 RX ADMIN — SODIUM CHLORIDE 125 ML/HR: 900 INJECTION, SOLUTION INTRAVENOUS at 19:25

## 2017-01-01 RX ADMIN — MEROPENEM 500 MG: 500 INJECTION, POWDER, FOR SOLUTION INTRAVENOUS at 04:19

## 2017-01-01 RX ADMIN — Medication 5 ML: at 21:25

## 2017-01-01 RX ADMIN — LEVOTHYROXINE SODIUM 125 MCG: 125 TABLET ORAL at 05:52

## 2017-01-01 RX ADMIN — OXYCODONE HYDROCHLORIDE AND ACETAMINOPHEN 1000 MG: 500 TABLET ORAL at 07:54

## 2017-01-01 RX ADMIN — NITROFURANTOIN MACROCRYSTALS 50 MG: 50 CAPSULE ORAL at 11:56

## 2017-01-01 RX ADMIN — MORPHINE SULFATE 2 MG: 2 INJECTION, SOLUTION INTRAMUSCULAR; INTRAVENOUS at 22:14

## 2017-01-01 RX ADMIN — MORPHINE SULFATE 2 MG: 2 INJECTION, SOLUTION INTRAMUSCULAR; INTRAVENOUS at 12:03

## 2017-01-01 RX ADMIN — HALOPERIDOL LACTATE 2 MG: 5 INJECTION, SOLUTION INTRAMUSCULAR at 12:26

## 2017-01-01 RX ADMIN — NITROFURANTOIN MACROCRYSTALS 50 MG: 50 CAPSULE ORAL at 05:05

## 2017-01-01 RX ADMIN — Medication 5 ML: at 05:17

## 2017-01-01 RX ADMIN — LEVOTHYROXINE SODIUM 125 MCG: 125 TABLET ORAL at 06:00

## 2017-01-01 RX ADMIN — MORPHINE SULFATE 2 MG: 2 INJECTION, SOLUTION INTRAMUSCULAR; INTRAVENOUS at 16:20

## 2017-01-18 PROBLEM — Z86.73 HX OF ARTERIAL ISCHEMIC STROKE: Status: ACTIVE | Noted: 2017-01-01

## 2017-02-22 PROBLEM — N39.0 RECURRENT UTI: Status: ACTIVE | Noted: 2017-01-01

## 2017-05-30 PROBLEM — N32.81 OAB (OVERACTIVE BLADDER): Status: ACTIVE | Noted: 2017-01-01

## 2017-05-30 PROBLEM — F01.50 VASCULAR DEMENTIA WITHOUT BEHAVIORAL DISTURBANCE (HCC): Status: ACTIVE | Noted: 2017-01-01

## 2017-05-30 PROBLEM — I25.10 ASCVD (ARTERIOSCLEROTIC CARDIOVASCULAR DISEASE): Status: ACTIVE | Noted: 2017-01-01

## 2017-07-07 PROBLEM — N39.0 UTI (URINARY TRACT INFECTION): Status: ACTIVE | Noted: 2017-01-01

## 2017-07-07 PROBLEM — I48.91 ATRIAL FIBRILLATION WITH CONTROLLED VENTRICULAR RESPONSE (HCC): Status: ACTIVE | Noted: 2017-01-01

## 2017-07-07 PROBLEM — A41.9 SEPSIS (HCC): Status: ACTIVE | Noted: 2017-01-01

## 2017-07-07 NOTE — PROGRESS NOTES
Assumed care of patient. Assessment completed and documented, see docflow. Patient arrived to room 803 via stretcher, accompanied by transport and family. Patient A/Ox3, oriented to room, staff and surroundings. Patient denies pain. NAD noted at present. Respirations even and non labored. Care giver at bedside verbalized understanding to call for needs. Call light within reach. Will continue to monitor.

## 2017-07-07 NOTE — H&P
HOSPITALIST H&P  NAME:  Gabby Rain   Age:  80 y.o.  :   4/3/1931   MRN:   [id number]  PCP: Mita Collier MD  Treatment Team: Attending Provider: Paris Knowles MD  HPI:   Gabby Rain is a 80 y.o. female that presented to the ED with 1 week history of weakness, mild confusion, urinary incontinence, diaphoresis, nausea and lower abdominal pain. ED evaluation detailed below shows sepsis UTI. She initially had A-fib RVR which has improved with IVF hydration and Cardizem. Urine culture from 17 shows MDR Klebsiella oxytoca. The hospitalist have been asked to admit. Results summary of Diagnostic Studies/Procedures copied from within [facility] EMR:  CXR  IMPRESSION: No acute cardiopulmonary abnormality.     Complete ROS done and is as stated in HPI or otherwise negative  Past Medical History:   Diagnosis Date    Abnormal cardiovascular function study     Abnormal EKG     Atrial dilatation, bilateral     echo 2016    Bradycardia 2016    Bruit (arterial) 2016    CAD (coronary artery disease) 2009    S/p PTCA and stent to mid-lad (2008)     Cancer ([address] Distant past    skin ca on nose years ago    Carotid stenosis     Chest pain 2016    Chronic atrial fibrillation ([address] 2016    PREVIOUSLY ON COUMADIN    Chronic constipation 7/10/2012    Chronic interstitial cystitis 2014    Followed by Dr. Haskins [place] Cyst of left kidney     Debility 2014    Diverticulosis     Edema 2016    Falls     GERD (gastroesophageal reflux disease) 2013    H/O hiatal hernia     Hemorrhage of rectum and anus 2013    RECTAL BLEEDING      Hip fracture ([address]     History of colon polyps     History of diverticulitis     History of kidney infection     History of nephritis [id number]    [facility]    History of stroke 2016    left basal ganglia    History of torn meniscus of left knee     2010, Dr Hightower  HTN (hypertension) 5/27/2009    Hyperlipidemia, mixed     Hypothyroid 2000    LVH (left ventricular hypertrophy)     Last echo 7/11/2016,     Malaise and fatigue 5/4/2016    Melanoma in situ of face ([address] 08/2016    left cheek    Mitral valve regurgitation     echo done 7/11/2016, mild annular calcification    Myalgia 5/4/2016    Nephrolithiasis 1966    one kidney stone 1966-passed on own    FERNANDA on CPAP 1998    Dr Yoselin Robertson S/P colon resection 7/20/2014 2/28/2014 s/p lap sigmoid resection for diverticular disease Dr. Zhao Anderson's ring 6/16/2010    Skull fracture ([address] 1/24/2016 2014 2 TO FALL, CLOSED HEAD INJURY    Sleep apnea 5/4/2016    Stroke (cerebrum) ([address]     Subdural bleeding ([address] 1/24/2016 2014 DUE TO HEAD INJURY WITH SKULL FX    Subendocardial infarction, subsequent episode of care ([address] 5/4/2016    Unstable gait 1/24/2016    Vertigo     Vitamin B 12 deficiency     Yeast UTI 7/22/2014      Past Surgical History:   Procedure Laterality Date    HX APPENDECTOMY  1965    HX CATARACT REMOVAL  2010    bilat.  Dr gil    HX COLECTOMY  2/2014    12\" removed    HX COLONOSCOPY  2014    Dr Jona Rico  2007    1 stent, D Siachos    HX GYN  12/2010    DR Metz, GYN biopsy    HX HIP FRACTURE TX Right     ORIF 7/8/2014 - [place]    HX MALIGNANT SKIN LESION EXCISION Left 9/2015    cheek, MOHS, DR Cecelia Mccurdy    HX ORTHOPAEDIC      heel; finger    HX ORTHOPAEDIC Right 1999    foot, plantar fascitis nerve surgery, dr Vadim Toribio Right 2014    hip    HX ORTHOPAEDIC  1/2003    Trigger finger release, Dr Nerissa Barrow      biopsies, female    HX OTHER SURGICAL      Plantar Fasciitis    HX OTHER SURGICAL  1998    blayne edwards, Dr Therese Griffiths HX PTCA  2007 [place], [facility] UROLOGICAL  2011    Cystoscopy, Dr Ruddy Helton vascular ligation-geraldo 1966-varicose veins      Allergies   Allergen Reactions    Candida Albicans Skin Test, Std Other (comments)     Sinus infection-dried fruits, anything made with fungus    Ciprofloxacin Swelling and Shortness of Breath     Lips swell/red face    Codeine Hives, Other (comments) and Shortness of Breath     Other reaction(s): Headache-I  Gives whelps    Erythromycin Shortness of Breath    Hydrocodone-Acetaminophen Hives, Other (comments) and Shortness of Breath     Gives whelps    Penicillins Hives    Propoxyphene Hives, Other (comments) and Shortness of Breath     WELTS    Sulfa (Sulfonamide Antibiotics) Swelling and Shortness of Breath     Swelling -mouth inside and out, rash    Cortisone Other (comments)     Flushed if taken in larger amounts    Darvon Compound-65 [Propoxyphene-Asa-Caffeine] Unknown (comments)    Homeopathic Products Other (comments)     Sx of sinus infection.-take off-pt does not know what this is    Quinolones Swelling     rash    Statins-Hmg-Coa Reductase Inhibitors Unknown (comments)      Social History   Substance Use Topics    Smoking status: Never Smoker    Smokeless tobacco: Never Used    Alcohol use No      Family History   Problem Relation Age of Onset    Heart Disease Mother      HTN, LIPIDS    Hypertension Mother     High Cholesterol Mother     Heart Disease Father      LIPIDS    Hypertension Father     High Cholesterol Father     Stroke Father      x5    Cancer Sister     Cancer Brother     Diabetes Brother     Heart Disease Brother     Heart Disease Paternal Grandfather     Other Son      1 LOCAL     Objective:     Visit Vitals    BP 99/55    Pulse (!) 106    Temp 97.9 °F (36.6 °C)    Resp 20    Ht 5' 6\" (1.676 m)    Wt 82.6 kg (182 lb)    SpO2 93%    BMI 29.38 kg/m2      Temp (24hrs), Av.3 °F (37.4 °C), Min:97.9 °F (36.6 °C), Max:102.2 °F (39 °C)    Oxygen Therapy  O2 Sat (%): 93 % (17 1826)  Pulse via Oximetry: 106 beats per minute (07/07/17 1715)  O2 Device: Room air (07/07/17 0253)  Physical Exam:  General:    Alert, cooperative, no distress, appears stated age. Head:   Normocephalic, without obvious abnormality, atraumatic. Nose:  Nares normal. No drainage or sinus tenderness. Lungs:   CTA  Heart:  irreg irreg   Abdomen:   Soft. supra-pubic tenderness. Not distended. Bowel sounds normal. No masses  Extremities: No cyanosis. No edema. No clubbing  Skin:     Texture, turgor normal. No rashes or lesions. Not Jaundiced  Neurologic: Alert and oriented times 3, generalized weakness, non-focal   Data Review:   Recent Results (from the past 24 hour(s))   CBC WITH AUTOMATED DIFF    Collection Time: 07/07/17  3:46 PM   Result Value Ref Range    WBC 12.6 (H) 4.3 - 11.1 K/uL    RBC 4.79 4.05 - 5.25 M/uL    HGB 15.6 (H) 11.7 - 15.4 g/dL    HCT 46.2 35.8 - 46.3 %    MCV 96.5 79.6 - 97.8 FL    MCH 32.6 26.1 - 32.9 PG    MCHC 33.8 31.4 - 35.0 g/dL    RDW 15.0 (H) 11.9 - 14.6 %    PLATELET 474 (L) 173 - 450 K/uL    MPV 12.0 10.8 - 14.1 FL    DF AUTOMATED      NEUTROPHILS 80 (H) 43 - 78 %    LYMPHOCYTES 5 (L) 13 - 44 %    MONOCYTES 14 (H) 4.0 - 12.0 %    EOSINOPHILS 0 (L) 0.5 - 7.8 %    BASOPHILS 0 0.0 - 2.0 %    IMMATURE GRANULOCYTES 0.6 0.0 - 5.0 %    ABS. NEUTROPHILS 10.0 (H) 1.7 - 8.2 K/UL    ABS. LYMPHOCYTES 0.7 0.5 - 4.6 K/UL    ABS. MONOCYTES 1.7 (H) 0.1 - 1.3 K/UL    ABS. EOSINOPHILS 0.0 0.0 - 0.8 K/UL    ABS. BASOPHILS 0.0 0.0 - 0.2 K/UL    ABS. IMM.  GRANS. 0.1 0.0 - 0.5 K/UL   METABOLIC PANEL, COMPREHENSIVE    Collection Time: 07/07/17  3:46 PM   Result Value Ref Range    Sodium 142 136 - 145 mmol/L    Potassium 3.9 3.5 - 5.1 mmol/L    Chloride 108 (H) 98 - 107 mmol/L    CO2 23 21 - 32 mmol/L    Anion gap 11 7 - 16 mmol/L    Glucose 129 (H) 65 - 100 mg/dL    BUN 21 8 - 23 MG/DL    Creatinine 1.15 (H) 0.6 - 1.0 MG/DL    GFR est AA 58 (L) >60 ml/min/1.73m2    GFR est non-AA 48 (L) >60 ml/min/1.73m2    Calcium 8.3 8.3 - 10.4 MG/DL    Bilirubin, total 1.5 (H) 0.2 - 1.1 MG/DL    ALT (SGPT) 18 12 - 65 U/L    AST (SGOT) 23 15 - 37 U/L    Alk. phosphatase 100 50 - 136 U/L    Protein, total 6.7 6.3 - 8.2 g/dL    Albumin 2.8 (L) 3.2 - 4.6 g/dL    Globulin 3.9 (H) 2.3 - 3.5 g/dL    A-G Ratio 0.7 (L) 1.2 - 3.5     PROCALCITONIN    Collection Time: 07/07/17  3:46 PM   Result Value Ref Range    Procalcitonin 0.5 ng/mL   BNP    Collection Time: 07/07/17  3:46 PM   Result Value Ref Range     pg/mL   POC TROPONIN-I    Collection Time: 07/07/17  3:50 PM   Result Value Ref Range    Troponin-I (POC) 0.03 0.0 - 0.08 ng/ml   POC LACTIC ACID    Collection Time: 07/07/17  3:52 PM   Result Value Ref Range    Lactic Acid (POC) 1.7 0.5 - 1.9 mmol/L   EKG, 12 LEAD, INITIAL    Collection Time: 07/07/17  3:52 PM   Result Value Ref Range    Ventricular Rate 126 BPM    Atrial Rate 416 BPM    QRS Duration 78 ms    Q-T Interval 308 ms    QTC Calculation (Bezet) 446 ms    Calculated R Axis 7 degrees    Calculated T Axis -159 degrees    Diagnosis       !! AGE AND GENDER SPECIFIC ECG ANALYSIS !!   Atrial fibrillation with rapid ventricular response  ST & T wave abnormality, consider inferolateral ischemia or digitalis effect  Abnormal ECG  No previous ECGs available     URINALYSIS W/ RFLX MICROSCOPIC    Collection Time: 07/07/17  4:45 PM   Result Value Ref Range    Color YELLOW      Appearance TURBID      Specific gravity 1.013 1.001 - 1.023      pH (UA) 5.5 5.0 - 9.0      Protein 100 (A) NEG mg/dL    Glucose NEGATIVE  mg/dL    Ketone NEGATIVE  NEG mg/dL    Bilirubin NEGATIVE  NEG      Blood MODERATE (A) NEG      Urobilinogen 0.2 0.2 - 1.0 EU/dL    Nitrites NEGATIVE  NEG      Leukocyte Esterase LARGE (A) NEG      WBC >100 (H) 0 /hpf    RBC 3-5 0 /hpf    Epithelial cells 0 0 /hpf    Bacteria 4+ (H) 0 /hpf    Casts 0-3 0 /lpf     Patient discussed with and assessment and plan made in collaboration with  supervising physician, Dr. Sherren Scala and Plan:     Active Hospital Problems    Diagnosis Date Noted    Sepsis ([facility] 07/07/2017    UTI (urinary tract infection) 07/07/2017    Atrial fibrillation with controlled ventricular response ([facility] 07/07/2017    Vascular dementia without behavioral disturbance 05/30/2017    FERNANDA (obstructive sleep apnea)      Dr Destini Marrero to remote telemetry   [address] [place] [address] and follow cultures   IVF hydration  Continue home medications as ordered   Caregiver to bring in home CPAP machine   PPD  FULL CODE as discussed with son/HCPOA: Katarzyna Loya [phone]  Estimated length of stay:>2 midnights   TANVIR Matias          Patient d/w the NP. I agree with the assessment and plan as outlined above. The patient is septic with her metabolic encephalopathy, temp of 102, tachycardia, elevated wbc and uti. Will continue appropriate bs abx based on prior cultures and monitor for resolution of her symptoms.

## 2017-07-07 NOTE — PROGRESS NOTES
TRANSFER - IN REPORT:    Verbal report received from Mary Bateman [facility] on Dustin Martin  being received from ED for routine progression of care      Report consisted of patients Situation, Background, Assessment and   Recommendations(SBAR). Information from the following report(s) SBAR, Kardex, ED Summary, Intake/Output, MAR, Accordion and Recent Results was reviewed with the receiving nurse. Opportunity for questions and clarification was provided. Assessment completed upon patients arrival to unit and care assumed.

## 2017-07-07 NOTE — ED PROVIDER NOTES
HPI Comments: Presents with complaint of weakness, fever of 100.4, slight confusion, incontinence which is not normal for her. Patient lives alone but has caregivers 9 hours a day. Caregiver at bedside. She has a history of UTIs. She denies any pain currently but reports she had left-sided chest pain earlier. She answers most questions appropriately but intermittently has tangential thoughts. Patient is a 80 y.o. female presenting with palpitations and altered mental status. The history is provided by the patient and a caregiver. The history is limited by the condition of the patient (slightly confused). Palpitations    This is a new problem. The current episode started 3 to 5 hours ago. The problem has not changed since onset. The problem occurs constantly. Associated with: fever. Associated symptoms include a fever, chest pain (resolved), irregular heartbeat, nausea, weakness and shortness of breath. Pertinent negatives include no diaphoresis, no numbness, no abdominal pain, no vomiting, no lower extremity edema and no cough. Risk factors include cardiac disease. Her past medical history is significant for atrial fibrillation. Altered mental status    This is a new problem. The current episode started 6 to 12 hours ago. The problem has been gradually worsening. Associated symptoms include confusion, somnolence and weakness. Pertinent negatives include no numbness.  Mental status baseline is normal.         Past Medical History:   Diagnosis Date    Abnormal cardiovascular function study     Abnormal EKG     Atrial dilatation, bilateral     echo 7/11/2016    Bradycardia 5/4/2016    Bruit (arterial) 5/4/2016    CAD (coronary artery disease) 5/27/2009    S/p PTCA and stent to mid-lad (8/2008)     Cancer ([address] Distant past    skin ca on nose years ago    Carotid stenosis     Chest pain 5/4/2016    Chronic atrial fibrillation ([address] 1/24/2016    PREVIOUSLY ON COUMADIN    Chronic constipation 7/10/2012    Chronic interstitial cystitis 7/20/2014    Followed by Dr. Rg Flores Cyst of left kidney     Debility 7/20/2014    Diverticulosis     Edema 5/4/2016    Falls     GERD (gastroesophageal reflux disease) 11/20/2013    H/O hiatal hernia     Hemorrhage of rectum and anus 11/20/2013    RECTAL BLEEDING      Hip fracture ([address]     History of colon polyps     History of diverticulitis     History of kidney infection     History of nephritis [id number]    [facility]    History of stroke 01/2016    left basal ganglia    History of torn meniscus of left knee     4/2010, Dr Landen Jimenez    HTN (hypertension) 5/27/2009    Hyperlipidemia, mixed     Hypothyroid 2000    LVH (left ventricular hypertrophy)     Last echo 7/11/2016,     Malaise and fatigue 5/4/2016    Melanoma in situ of face ([address] 08/2016    left cheek    Mitral valve regurgitation     echo done 7/11/2016, mild annular calcification    Myalgia 5/4/2016    Nephrolithiasis 1966    one kidney stone 1966-passed on own    FERNANDA on CPAP 1998    Dr Greg Herrera S/P colon resection 7/20/2014 2/28/2014 s/p lap sigmoid resection for diverticular disease Dr. Robb Anderson's ring 6/16/2010    Skull fracture ([address] 1/24/2016 2014 2 TO FALL, CLOSED HEAD INJURY    Sleep apnea 5/4/2016    Stroke (cerebrum) ([address]     Subdural bleeding ([address] 1/24/2016 2014 DUE TO HEAD INJURY WITH SKULL FX    Subendocardial infarction, subsequent episode of care ([address] 5/4/2016    Unstable gait 1/24/2016    Vertigo     Vitamin B 12 deficiency     Yeast UTI 7/22/2014       Past Surgical History:   Procedure Laterality Date    HX APPENDECTOMY  1965    HX CATARACT REMOVAL  2010    bilat.  Dr jeffery Sosa COLECTOMY  2/2014    12\" removed    HX COLONOSCOPY  2014    Dr Maribel Carlin  2007    1 stent, D Siachos    HX GYN  12/2010    DR Metz, GYN biopsy    HX HIP FRACTURE TX Right     ORIF 7/8/2014 - [place]    HX MALIGNANT SKIN LESION EXCISION Left 2015    cheek, MOHS, DR Lual Sprain    HX ORTHOPAEDIC      heel; finger    HX ORTHOPAEDIC Right     foot, plantar fascitis nerve surgery, dr Claudette Covington Right 2014    hip    HX ORTHOPAEDIC  2003    Trigger finger release, Dr Tabitha Landon      biopsies, female    HX OTHER SURGICAL      Plantar Fasciitis    HX OTHER SURGICAL      blayne edwards, Dr Chavez Mu HX PTCA  2007 OR 08    [address] [facility], [address] [place] [address] [address] [facility] UROLOGICAL  2011    Cystoscopy, Dr Glass Fearing      vascular ligation-geraldo 1966-varicose veins         Family History:   Problem Relation Age of Onset    Heart Disease Mother      HTN, LIPIDS    Hypertension Mother     High Cholesterol Mother     Heart Disease Father      LIPIDS    Hypertension Father     High Cholesterol Father     Stroke Father      x5    Cancer Sister     Cancer Brother     Diabetes Brother     Heart Disease Brother     Heart Disease Paternal Grandfather     Other Son      1 LOCAL       Social History     Social History    Marital status: SINGLE     Spouse name: N/A    Number of children: 4    Years of education: N/A     Occupational History    Adm asisstant      Social History Main Topics    Smoking status: Never Smoker    Smokeless tobacco: Never Used    Alcohol use No    Drug use: No    Sexual activity: Not Currently     Other Topics Concern     Service No    Blood Transfusions No    Caffeine Concern No    Sleep Concern Yes     sleeps poorly recently last week    Stress Concern Yes     son, POA, feels he is spending money    Exercise Yes    Seat Belt Yes    Self-Exams Yes     Social History Narrative    ** Merged History Encounter **         ** Data from: 3/30/16 Enc Dept: [facility] INTERNAL MEDICINE AND DIAGNOSTICS      MI, 22 years.  1 son in [place]         ** Data from: 1/24/16 Enc Dept: [facility] EMERGENCY DEPT    1/24/16:  PATIENT RELEASED FROM REHAB AT [facility] FIRST PART OF January. SHE WAS IN REHAB FROM 4/15 UNTIL THEN AFTER BRAIN BLEED 2 TO TRAUMATIC HEAD INJURY DUE TO FALL WITH SKULL FX. SHE RETURNED TO HER HOME WITH CAREGIVERS 24/7. SON MILI LIVES [facility] AND IS MEDICAL AND LEGAL POA. ([facility]:  [phone]). SHE HAS TWO ADDITIONAL SONS OUT OF STATE. HER PRIMARY CAREGIVER IS DAX PERALTA (RT:[phone]), WHO HAS BEEN TAKING CARE OF HER FOR OVER A YEAR. ALLERGIES: Candida albicans skin test, std; Ciprofloxacin; Codeine; Erythromycin; Hydrocodone-acetaminophen; Penicillins; Propoxyphene; Sulfa (sulfonamide antibiotics); Cortisone; Darvon compound-65 [propoxyphene-asa-caffeine]; Homeopathic products; Quinolones; and Statins-hmg-coa reductase inhibitors    Review of Systems   Constitutional: Positive for fever. Negative for diaphoresis. Respiratory: Positive for shortness of breath. Negative for cough. Cardiovascular: Positive for chest pain (resolved) and palpitations. Gastrointestinal: Positive for nausea. Negative for abdominal pain and vomiting. Neurological: Positive for weakness. Negative for numbness. Psychiatric/Behavioral: Positive for confusion. All other systems reviewed and are negative. Vitals:    07/07/17 1544 07/07/17 1545 07/07/17 1552   BP:  133/71    Pulse: (!) 138 (!) 113 (!) 132   Resp:  18    Temp:  98.1 °F (36.7 °C)    SpO2: 94% 92%    Weight:  82.6 kg (182 lb)    Height:  5' 6\" (1.676 m)             Physical Exam   Constitutional: She appears well-developed and well-nourished. She appears listless. No distress. HENT:   Head: Normocephalic and atraumatic. Neck: Normal range of motion. Neck supple. Cardiovascular: An irregularly irregular rhythm present. Tachycardia present. Pulmonary/Chest: Effort normal. No respiratory distress. She has no wheezes. She has no rales. Abdominal: Soft.  She exhibits no distension. There is no tenderness. There is no rebound and no guarding. Musculoskeletal: Normal range of motion. She exhibits edema (mild). Neurological: She appears listless. She is disoriented (occasionally). No cranial nerve deficit. Skin: Skin is warm and dry. She is not diaphoretic. Psychiatric: Her speech is tangential. She exhibits abnormal recent memory. Nursing note and vitals reviewed. MDM  Number of Diagnoses or Management Options  Acute cystitis without hematuria:   Acute encephalopathy:   Sepsis, due to unspecified organism [facility]):   Diagnosis management comments: Review of her chart shows a history of multidrug resistant Klebsiella so was given tobramycin. Pt with fever 102.2, elevated wbc, weakness, confusion. Needs admission for IV abx for her UTI.          Amount and/or Complexity of Data Reviewed  Clinical lab tests: ordered and reviewed  Decide to obtain previous medical records or to obtain history from someone other than the patient: yes (Caregiver)  Review and summarize past medical records: yes  Discuss the patient with other providers: yes  Independent visualization of images, tracings, or specimens: yes (afib with rvr, no ST changes  )    Risk of Complications, Morbidity, and/or Mortality  Presenting problems: high  Diagnostic procedures: moderate  Management options: high    Patient Progress  Patient progress: improved    ED Course       Procedures

## 2017-07-07 NOTE — ED TRIAGE NOTES
Pt arrive via EMS coming from home with AMS, possible sepsis alert. Family states pt was more ams around 1400 and pt is now more alert per EMS. , tep 100.4 orally, tachy at 100-120s. Pt has becoming increasingly more weak. /80. 18 G L AC. EMS gave 1 liter of NS. Pt currently denies any pain. Pt is alert and oriented to person, birth date, place and year. 1 set of blood cultures done by EMS.

## 2017-07-07 NOTE — ED NOTES
TRANSFER - OUT REPORT:    Verbal report given to Sana (name) on Sophia Parker  being transferred to 8th floor (unit) for routine progression of care       Report consisted of patients Situation, Background, Assessment and   Recommendations(SBAR). Information from the following report(s) SBAR was reviewed with the receiving nurse. Lines:   Peripheral IV Right Hand (Active)       Peripheral IV 07/07/17 Right Antecubital (Active)        Opportunity for questions and clarification was provided.       Patient transported with:   Kaizena

## 2017-07-08 NOTE — PROGRESS NOTES
Problem: Mobility Impaired (Adult and Pediatric)  Goal: *Therapy Goal (Edit Goal, Insert Text)  STG:  (1.)Ms. Aaron Uriarte will move from supine to sit and sit to supine , scoot up and down and roll side to side with CGA x 1 within 4 day(s). (2.)Ms. Aaron Uriarte will transfer from bed to chair and chair to bed with CONTACT GUARD ASSIST using the least restrictive device within 4 day(s). (3.)Ms. Carlos will ambulate with CONTACT GUARD ASSIST for 150 feet with the least restrictive device within 4 day(s). LTG:  (1.)Ms. Aaron Uriarte will move from supine to sit and sit to supine , scoot up and down and roll side to side in bed with MODIFIED INDEPENDENCE within 7 day(s). (2.)Ms. Aaron Uriarte will transfer from bed to chair and chair to bed with MODIFIED INDEPENDENCE using the least restrictive device within 7 day(s). (3.)Ms. Aaron Uriarte will ambulate with MODIFIED INDEPENDENCE for 300 feet with the least restrictive device within 7 day(s). ________________________________________________________________________________________________       PHYSICAL THERAPY: INITIAL ASSESSMENT, TREATMENT DAY: DAY OF ASSESSMENT 7/8/2017  INPATIENT: Hospital Day: 2  Payor: Keira Morris / Plan: [address] [place] [address] HMO / Product Type: [employer] /      NAME/AGE/GENDER: Shakira Gilbert is a 80 y.o. female       PRIMARY DIAGNOSIS: Sepsis ([address] Sepsis ([address] Sepsis ([facility]        ICD-10: Treatment Diagnosis:       · Generalized Muscle Weakness (M62.81)   Precaution/Allergies:  Candida albicans skin test, std; Ciprofloxacin; Codeine; Erythromycin; Hydrocodone-acetaminophen; Penicillins; Propoxyphene; Sulfa (sulfonamide antibiotics); Cortisone; Darvon compound-65 [propoxyphene-asa-caffeine];  Homeopathic products; Quinolones; and Statins-hmg-coa reductase inhibitors       ASSESSMENT:      Ms. Aaron Uriarte is a pleasant frail elderly female with above diagnosis who demonstrates with decreased transfers, ambulation and mobility below her prior functional baseline. All transfers are currently limited at [address] x 1 to MIN A x 1 out of bed to sit and stand followed by ambulation with rollator in room for up to 50 feet with the deficits stated below minimal assistance x 1. She then returns to seated edge of bed before return to supine with CGA x 1 to minimal assistance x 1. Skilled PT is indicated for this patient's functional mobility deficits. ?????? ? ? This section established at most recent assessment??????????   PROBLEM LIST (Impairments causing functional limitations):  1. Decreased Strength affecting function  2. Decreased Transfer Abilities  3. Decreased Ambulation Ability/Technique  4. Decreased Balance  5. Decreased Activity Tolerance  6. Decreased Pacing Skills  7. Increased Fatigue affecting function  8. Decreased Flexibility/Joint Mobility  9. Decreased [place] with Home Exercise Program  REHABILITATION POTENTIAL FOR STATED GOALS: GOOD  PLAN OF CARE:  INTERVENTIONS PLANNED: (Benefits and precautions of physical therapy have been discussed with the patient.)  1. balance exercise  2. bed mobility  3. family education  4. gait training  5. range of motion: active/assisted/passive  6. therapeutic activities  7. therapeutic exercise/strengthening  8. transfer training  FREQUENCY/DURATION: Follow patient 1-2 times per day/4-7 days per week for until goals are met in order to address above goals. RECOMMENDED REHABILITATION/EQUIPMENT: (at time of discharge pending progress): To be determined based upon patient functional status at medical discharge.  _____________                   HISTORY:   History of Present Injury/Illness (Reason for Referral):  Ashley Hunter is a 80 y.o. female that presented to the ED with 1 week history of weakness, mild confusion, urinary incontinence, diaphoresis, nausea and lower abdominal pain. ED evaluation detailed below shows sepsis UTI.  She initially had A-fib RVR which has improved with IVF hydration and Cardizem. Urine culture from 2/22/17 shows MDR Klebsiella oxytoca. The hospitalist have been asked to admit. Past Medical History/Comorbidities:   Ms. Raul Carr  has a past medical history of Abnormal cardiovascular function study; Abnormal EKG; Atrial dilatation, bilateral; Bradycardia (5/4/2016); Bruit (arterial) (5/4/2016); CAD (coronary artery disease) (5/27/2009); Cancer ([address] (Distant past); Carotid stenosis; Chest pain (5/4/2016); Chronic atrial fibrillation ([address] (1/24/2016); Chronic constipation (7/10/2012); Chronic interstitial cystitis (7/20/2014); Cyst of left kidney; Debility (7/20/2014); Diverticulosis; Edema (5/4/2016); Falls; GERD (gastroesophageal reflux disease) (11/20/2013); H/O hiatal hernia; Hemorrhage of rectum and anus (11/20/2013); Hip fracture ([address] History of colon polyps; History of diverticulitis; History of kidney infection; History of nephritis ([id number]); History of stroke (01/2016); History of torn meniscus of left knee; HTN (hypertension) (5/27/2009); Hyperlipidemia, mixed; Hypothyroid (2000); LVH (left ventricular hypertrophy); Malaise and fatigue (5/4/2016); Melanoma in situ of face ([address] (08/2016); Mitral valve regurgitation; Myalgia (5/4/2016); Nephrolithiasis (1966); FERNANDA on CPAP (1998); S/P colon resection (7/20/2014); Schatzki's ring (6/16/2010); Skull fracture ([address] (1/24/2016); Sleep apnea (5/4/2016); Stroke (cerebrum) ([address] Subdural bleeding ([address] (1/24/2016); Subendocardial infarction, subsequent episode of care [facility]) (5/4/2016); Unstable gait (1/24/2016); Vertigo; Vitamin B 12 deficiency; and Yeast UTI (7/22/2014). She also has no past medical history of DEMENTIA.   Ms. Raul Carr  has a past surgical history that includes ptca (2007 OR 08); vascular surgery procedure unlist; orthopaedic; other surgical; other surgical; hip fracture tx (Right); colectomy (2/2014); orthopaedic (Right, 1999); orthopaedic (Right, 2014); appendectomy (1965); other surgical (1998); tubal ligation (1966); malignant skin lesion excision (Left, 9/2015); coronary stent placement (2007); cataract removal (2010); colonoscopy (2014); orthopaedic (1/2003); urological (2011); and gyn (12/2010). Social History/Living Environment:   Home Environment: Private residence  One/Two Story Residence: One story  Living Alone: Yes (Has caregivers 9 hours/day)  Support Systems: Other (comments) (caregivers)  Patient Expects to be Discharged to[de-identified] Unknown  Current DME Used/Available at Home: Walker, CPAP  Prior Level of Function/Work/Activity:   Pt was functioning with supervision using a Rolator in home environment prior to this admission. Home Environment: Private residence  # Steps to Enter: 2  One/Two Story Residence: One story  Living Alone: No  Support Systems:  (24/7 sitters)  Patient Expects to be Discharged to[de-identified] Private residence (with 24/7 sitters, pt is unsafe to be left alone)  Current DME Used/Available at Home: Walker, rollator  Dominant Side:         RIGHT  Personal Factors:          Sex:  female        Age:  80 y.o.    Number of Personal Factors/Comorbidities that affect the Plan of Care: 1-2: MODERATE COMPLEXITY   EXAMINATION:   Most Recent Physical Functioning:   Gross Assessment:  AROM: Generally decreased, functional  Strength: Generally decreased, functional  Coordination: Generally decreased, functional  Tone: Normal  Sensation: Intact               Posture:  Posture (WDL): Exceptions to WDL  Posture Assessment: Cervical, Forward head, Rounded shoulders  Balance:  Sitting: Impaired  Sitting - Static: Fair (occasional)  Sitting - Dynamic: Fair (occasional)  Standing: Impaired  Standing - Static: Fair  Standing - Dynamic : Fair Bed Mobility:  Rolling: Contact guard assistance;Minimum assistance  Supine to Sit: Contact guard assistance;Minimum assistance  Sit to Supine: Contact guard assistance;Minimum assistance  Scooting: Contact guard assistance;Minimum assistance  Wheelchair Mobility:     Transfers:  Sit to Stand: Contact guard assistance;Minimum assistance  Stand to Sit: Contact guard assistance;Minimum assistance  Gait:     Base of Support: Center of gravity altered  Speed/Sherrill: Fluctuations; Pace decreased (<100 feet/min); Shuffled; Slow  Step Length: Left shortened;Right shortened  Stance: Left decreased;Right decreased;Time;Weight shift  Gait Abnormalities: Decreased step clearance;Shuffling gait; Steppage gait; Step to gait;Trunk sway increased  Distance (ft): 50 Feet (ft)  Assistive Device: Walker, rollator  Ambulation - Level of Assistance: Minimal assistance  Interventions: Manual cues; Safety awareness training; Tactile cues; Verbal cues; Visual/Demos       Body Structures Involved:  1. Bones  2. Muscles Body Functions Affected:  1. Neuromusculoskeletal  2. Movement Related  3. Skin Related  4. Metobolic/Endocrine Activities and Participation Affected:  1. Mobility   Number of elements that affect the Plan of Care: 3: MODERATE COMPLEXITY   CLINICAL PRESENTATION:   Presentation: Stable and uncomplicated: LOW COMPLEXITY   CLINICAL DECISION MAKIN [facility] [address] [id number] AM-PAC 6 Clicks   Basic Mobility Inpatient Short Form  How much difficulty does the patient currently have. .. Unable A Lot A Little None   1. Turning over in bed (including adjusting bedclothes, sheets and blankets)? [ ] 1   [ ] 2   [X] 3   [ ] 4   2. Sitting down on and standing up from a chair with arms ( e.g., wheelchair, bedside commode, etc.)   [ ] 1   [ ] 2   [X] 3   [ ] 4   3. Moving from lying on back to sitting on the side of the bed? [ ] 1   [ ] 2   [X] 3   [ ] 4   How much help from another person does the patient currently need. .. Total A Lot A Little None   4. Moving to and from a bed to a chair (including a wheelchair)? [ ] 1   [ ] 2   [X] 3   [ ] 4   5. Need to walk in hospital room? [ ] 1   [X] 2   [ ] 3   [ ] 4   6. Climbing 3-5 steps with a railing?    [X] 1   [ ] 2   [ ] 3   [ ] 4   © 2007, Trustees of [address] [place] [address] [address] [id number], under license to [employer]. All rights reserved    Score:  Initial: 15 Most Recent: X (Date: -- )     Interpretation of Tool:  Represents activities that are increasingly more difficult (i.e. Bed mobility, Transfers, Gait). Score 24 23 22-20 19-15 14-10 9-7 6       Modifier CH CI CJ CK CL CM CN         · Mobility - Walking and Moving Around:               - CURRENT STATUS:    CK - 40%-59% impaired, limited or restricted               - GOAL STATUS:           CJ - 20%-39% impaired, limited or restricted               - D/C STATUS:                       ---------------To be determined---------------  Payor: [employer] MEDICARE / Plan: [address] [place] [address] HMO / Product Type: [employer] Medicare /       Medical Necessity:     · Patient demonstrates good rehab potential due to higher previous functional level. Reason for Services/Other Comments:  · Patient continues to require skilled intervention due to medical complications. Use of outcome tool(s) and clinical judgement create a POC that gives a: Clear prediction of patient's progress: LOW COMPLEXITY                 TREATMENT:   (In addition to Assessment/Re-Assessment sessions the following treatments were rendered)   Pre-treatment Symptoms/Complaints:  0/10   Pain: Initial:   Pain Intensity 1: 0  Post Session:  0/10 no pain reported at this time. Assessment/Reassessment only, no treatment provided today     Braces/Orthotics/Lines/Etc:   · O2 Device: Room air  Treatment/Session Assessment:    · Response to Treatment:  Slightly unsteady on her feet during ambulation and mobility.     · Interdisciplinary Collaboration:  · Physical Therapist  · Registered Nurse  · After treatment position/precautions:  · Supine in bed  · Bed alarm/tab alert on  · Bed/Chair-wheels locked  · Bed in low position  · Call light within reach  · RN notified  · Family at bedside  · Side rails x 3  · Compliance with Program/Exercises: Will assess as treatment progresses. · Recommendations/Intent for next treatment session: \"Next visit will focus on advancements to more challenging activities, reduction in assistance provided and dynamic transfers, ambulation and mobility. \".   Total Treatment Duration:  PT Patient Time In/Time Out  Time In: 1300  Time Out: 79 [facility]

## 2017-07-08 NOTE — PROGRESS NOTES
Patient is calm and has caregiver by her side. Patient wanted to talk about her family dynamics. Her focus was upon her oldest son who is also her POA. He had recently taken her to court for inability to manage her funds per patient. Patient said two of her other children believe she is capable of managing her affairs. Patient emphasized that the main point she struggles with is her son has fallen from the alis of the [facility]. Will continue to explore her concerns and hopefully to find peace which will contribute to her total healing process.   Signed by Nance Pallas, chaplain

## 2017-07-08 NOTE — PROGRESS NOTES
Assumed care of patient. Assessment completed and documented, see docflow. Patient awakens to voice. Patient with intermittent confusion but reorients easily. IVF infusing without difficulty. NAD noted at present. Respirations even and non labored. Bed alarm in place for patient safety. Patient able to verbalize needs. Call light within reach. Will continue to monitor.

## 2017-07-08 NOTE — PROGRESS NOTES
Hospitalist Progress Note    2017  3:16 PM  Admit Date: 2017  3:44 PM   NAME: Ana Gillespie   :  4/3/1931   MRN:  [id number]   Attending: Asuncion eLa MD  PCP:  Kenji Carrero MD  Treatment Team: Attending Provider: Asuncion Lea MD; Utilization Review: Lyn Ayoub RN  SUBJECTIVE:       Ms. Marycarmen Johnston is a 79 yo female living independently, PMH of afib admitted with UTI/sepsis/afib with RVR resolved, day 2 merrem pending ucx, prior MDR klebsiella UTI. BC pending/ UCX GNR. CXR negative. Will need SW for dispo         7-8 concerned about her POA and finances, .2, has cough/anoreixa/dyspnea/sputum/        Recent Results (from the past 24 hour(s))   CBC WITH AUTOMATED DIFF    Collection Time: 17  3:46 PM   Result Value Ref Range    WBC 12.6 (H) 4.3 - 11.1 K/uL    RBC 4.79 4.05 - 5.25 M/uL    HGB 15.6 (H) 11.7 - 15.4 g/dL    HCT 46.2 35.8 - 46.3 %    MCV 96.5 79.6 - 97.8 FL    MCH 32.6 26.1 - 32.9 PG    MCHC 33.8 31.4 - 35.0 g/dL    RDW 15.0 (H) 11.9 - 14.6 %    PLATELET 753 (L) 408 - 450 K/uL    MPV 12.0 10.8 - 14.1 FL    DF AUTOMATED      NEUTROPHILS 80 (H) 43 - 78 %    LYMPHOCYTES 5 (L) 13 - 44 %    MONOCYTES 14 (H) 4.0 - 12.0 %    EOSINOPHILS 0 (L) 0.5 - 7.8 %    BASOPHILS 0 0.0 - 2.0 %    IMMATURE GRANULOCYTES 0.6 0.0 - 5.0 %    ABS. NEUTROPHILS 10.0 (H) 1.7 - 8.2 K/UL    ABS. LYMPHOCYTES 0.7 0.5 - 4.6 K/UL    ABS. MONOCYTES 1.7 (H) 0.1 - 1.3 K/UL    ABS. EOSINOPHILS 0.0 0.0 - 0.8 K/UL    ABS. BASOPHILS 0.0 0.0 - 0.2 K/UL    ABS. IMM.  GRANS. 0.1 0.0 - 0.5 K/UL   METABOLIC PANEL, COMPREHENSIVE    Collection Time: 17  3:46 PM   Result Value Ref Range    Sodium 142 136 - 145 mmol/L    Potassium 3.9 3.5 - 5.1 mmol/L    Chloride 108 (H) 98 - 107 mmol/L    CO2 23 21 - 32 mmol/L    Anion gap 11 7 - 16 mmol/L    Glucose 129 (H) 65 - 100 mg/dL    BUN 21 8 - 23 MG/DL    Creatinine 1.15 (H) 0.6 - 1.0 MG/DL    GFR est AA 58 (L) >60 ml/min/1.73m2    GFR est non-AA 48 (L) >60 ml/min/1.73m2    Calcium 8.3 8.3 - 10.4 MG/DL    Bilirubin, total 1.5 (H) 0.2 - 1.1 MG/DL    ALT (SGPT) 18 12 - 65 U/L    AST (SGOT) 23 15 - 37 U/L    Alk. phosphatase 100 50 - 136 U/L    Protein, total 6.7 6.3 - 8.2 g/dL    Albumin 2.8 (L) 3.2 - 4.6 g/dL    Globulin 3.9 (H) 2.3 - 3.5 g/dL    A-G Ratio 0.7 (L) 1.2 - 3.5     PROCALCITONIN    Collection Time: 07/07/17  3:46 PM   Result Value Ref Range    Procalcitonin 0.5 ng/mL   BNP    Collection Time: 07/07/17  3:46 PM   Result Value Ref Range     pg/mL   POC TROPONIN-I    Collection Time: 07/07/17  3:50 PM   Result Value Ref Range    Troponin-I (POC) 0.03 0.0 - 0.08 ng/ml   POC LACTIC ACID    Collection Time: 07/07/17  3:52 PM   Result Value Ref Range    Lactic Acid (POC) 1.7 0.5 - 1.9 mmol/L   EKG, 12 LEAD, INITIAL    Collection Time: 07/07/17  3:52 PM   Result Value Ref Range    Ventricular Rate 126 BPM    Atrial Rate 416 BPM    QRS Duration 78 ms    Q-T Interval 308 ms    QTC Calculation (Bezet) 446 ms    Calculated R Axis 7 degrees    Calculated T Axis -159 degrees    Diagnosis       !! AGE AND GENDER SPECIFIC ECG ANALYSIS !!   Atrial fibrillation with rapid ventricular response  ST & T wave abnormality, consider inferolateral ischemia or digitalis effect  Abnormal ECG  No previous ECGs available     CULTURE, BLOOD    Collection Time: 07/07/17  4:37 PM   Result Value Ref Range    Special Requests: RIGHT ANTECUBITAL      Culture result: NO GROWTH AFTER 13 HOURS     URINALYSIS W/ RFLX MICROSCOPIC    Collection Time: 07/07/17  4:45 PM   Result Value Ref Range    Color YELLOW      Appearance TURBID      Specific gravity 1.013 1.001 - 1.023      pH (UA) 5.5 5.0 - 9.0      Protein 100 (A) NEG mg/dL    Glucose NEGATIVE  mg/dL    Ketone NEGATIVE  NEG mg/dL    Bilirubin NEGATIVE  NEG      Blood MODERATE (A) NEG      Urobilinogen 0.2 0.2 - 1.0 EU/dL    Nitrites NEGATIVE  NEG      Leukocyte Esterase LARGE (A) NEG      WBC >100 (H) 0 /hpf    RBC 3-5 0 /hpf Epithelial cells 0 0 /hpf    Bacteria 4+ (H) 0 /hpf    Casts 0-3 0 /lpf   CULTURE, URINE    Collection Time: 07/07/17  4:45 PM   Result Value Ref Range    Special Requests: NO SPECIAL REQUESTS      Culture result: (A)       >100,000  COLONIES/mL  GRAM NEGATIVE RODS  IDENTIFICATION AND SUSCEPTIBILITY TO FOLLOW     CBC W/O DIFF    Collection Time: 07/08/17  4:53 AM   Result Value Ref Range    WBC 13.1 (H) 4.3 - 11.1 K/uL    RBC 4.31 4.05 - 5.25 M/uL    HGB 13.6 11.7 - 15.4 g/dL    HCT 43.0 35.8 - 46.3 %    MCV 99.8 (H) 79.6 - 97.8 FL    MCH 31.6 26.1 - 32.9 PG    MCHC 31.6 31.4 - 35.0 g/dL    RDW 15.4 (H) 11.9 - 14.6 %    PLATELET 997 (L) 233 - 450 K/uL    MPV 12.0 10.8 - 82.8 FL   METABOLIC PANEL, COMPREHENSIVE    Collection Time: 07/08/17  4:53 AM   Result Value Ref Range    Sodium 146 (H) 136 - 145 mmol/L    Potassium 4.5 3.5 - 5.1 mmol/L    Chloride 111 (H) 98 - 107 mmol/L    CO2 27 21 - 32 mmol/L    Anion gap 8 7 - 16 mmol/L    Glucose 114 (H) 65 - 100 mg/dL    BUN 22 8 - 23 MG/DL    Creatinine 1.07 (H) 0.6 - 1.0 MG/DL    GFR est AA >60 >60 ml/min/1.73m2    GFR est non-AA 52 (L) >60 ml/min/1.73m2    Calcium 7.8 (L) 8.3 - 10.4 MG/DL    Bilirubin, total 1.0 0.2 - 1.1 MG/DL    ALT (SGPT) 16 12 - 65 U/L    AST (SGOT) 18 15 - 37 U/L    Alk.  phosphatase 82 50 - 136 U/L    Protein, total 6.0 (L) 6.3 - 8.2 g/dL    Albumin 2.4 (L) 3.2 - 4.6 g/dL    Globulin 3.6 (H) 2.3 - 3.5 g/dL    A-G Ratio 0.7 (L) 1.2 - 3.5         Allergies   Allergen Reactions    Candida Albicans Skin Test, Std Other (comments)     Sinus infection-dried fruits, anything made with fungus    Ciprofloxacin Swelling and Shortness of Breath     Lips swell/red face    Codeine Hives, Other (comments) and Shortness of Breath     Other reaction(s): Headache-I  Gives whelps    Erythromycin Shortness of Breath    Hydrocodone-Acetaminophen Hives, Other (comments) and Shortness of Breath     Gives whelps    Penicillins Hives    Propoxyphene Hives, Other (comments) and Shortness of Breath     WELTS    Sulfa (Sulfonamide Antibiotics) Swelling and Shortness of Breath     Swelling -mouth inside and out, rash    Cortisone Other (comments)     Flushed if taken in larger amounts    Darvon Compound-65 [Propoxyphene-Asa-Caffeine] Unknown (comments)    Homeopathic Products Other (comments)     Sx of sinus infection.-take off-pt does not know what this is    Quinolones Swelling     rash    Statins-Hmg-Coa Reductase Inhibitors Unknown (comments)     Current Facility-Administered Medications   Medication Dose Route Frequency Provider Last Rate Last Dose    benzocaine-menthol (CEPACOL) lozenge  1 Lozenge Oral Q2H PRN Skeet Brittle, MD        PPD read reminder  1 Each Other Q24H Dru Navarro MD        ascorbic acid (vitamin C) (VITAMIN C) tablet 1,000 mg  1,000 mg Oral DAILY Arcadia Has Je Chadwick., PA   1,000 mg at 07/08/17 2219    docusate sodium (COLACE) capsule 100 mg  100 mg Oral BID Chino French., PA   100 mg at 07/08/17 6488    ferrous gluconate 324 mg (38 mg iron) tablet 1 Tab  1 Tab Oral DAILY Chino French., PA   1 Tab at 07/08/17 0940    metoprolol succinate (TOPROL-XL) XL tablet 25 mg  25 mg Oral DAILY Chino French., PA   25 mg at 07/08/17 0940    memantine (NAMENDA) tablet 10 mg  10 mg Oral DAILY Chino French., PA   10 mg at 07/08/17 8044    levothyroxine (SYNTHROID) tablet 125 mcg  125 mcg Oral ACB Ar[place]ia Has Je Chadwick. PA   125 mcg at 07/08/17 0600    sodium chloride (NS) flush 5 mL  5 mL InterCATHeter Q8H Chino French., PA   5 mL at 07/08/17 0544    sodium chloride (NS) flush 5-10 mL  5-10 mL InterCATHeter PRN Chino French., PA        0.9% sodium chloride infusion  125 mL/hr IntraVENous CONTINUOUS Chino French.,  mL/hr at 07/08/17 0545 125 mL/hr at 07/08/17 0545    meropenem (MERREM) 500 mg in 0.9% sodium chloride (MBP/ADV) 50 mL MBP  500 mg IntraVENous Q8H Dru Cordial,  mL/hr at 17 1359 500 mg at 17 1359           Review of Systems as noted above in HPI   PHYSICAL EXAM     Visit Vitals    /64    Pulse 91    Temp 98.2 °F (36.8 °C)    Resp 20    Ht 5' 6\" (1.676 m)    Wt 82.6 kg (182 lb)    SpO2 95%    BMI 29.38 kg/m2      Temp (24hrs), Av.5 °F (36.9 °C), Min:97.4 °F (36.3 °C), Max:102.2 °F (39 °C)    Oxygen Therapy  O2 Sat (%): 95 % (17 1119)  Pulse via Oximetry: 106 beats per minute (17 1715)  O2 Device: Room air (17 1908)    Intake/Output Summary (Last 24 hours) at 17 1516  Last data filed at 17 2248   Gross per 24 hour   Intake              240 ml   Output                0 ml   Net              240 ml      General: No acute distress,conversive  Lungs:  CTAB, good effort   Heart:  Regular rate and irregular rhythm, no murmur, no edema   Abdomen: Soft, nontender and nondistended, normal bowel sounds  Extremities: Warm and dry         DIAGNOSTIC STUDIES      Labs and Studies from previous 24 hours have been personally reviewed by myself           Full Code    ASSESSMENT / Bernice Atkinson 169 Problems    Diagnosis Date Noted    Sepsis ([facility] 2017    UTI (urinary tract infection) 2017    Atrial fibrillation with controlled ventricular response ([facility] 2017    Vascular dementia without behavioral disturbance 2017    FERNANDA (obstructive sleep apnea)      Dr Joanna Villatoro       -continue kt, follow micro   -afib rate controlled on lopressor, no anticoagulants   -SW for dispo,PT/OT    FEN:oral,IVF  DVT Prophylaxis:  SCD  Disposition:pending   Time spent with patient:  Care plan addressed:  Risk Assessment:  Signed By: Tami Caldera MD     2017

## 2017-07-08 NOTE — PROGRESS NOTES
Reassessment unchanged. Patient resting quietly in bed. NAD noted. Respirations even and non labored. Will continue to monitor.

## 2017-07-08 NOTE — PROGRESS NOTES
Bedside report received from Eloisa, Pending sale to [facility].

## 2017-07-09 NOTE — PROGRESS NOTES
Spoke with her son Simran Gimenez who states patient is not able to make her own decisions and he is interested in rehab, will update case management as she has guardian per Bernice Koehler MD

## 2017-07-09 NOTE — PROGRESS NOTES
Hospitalist Progress Note    2017  1:45 PM  Admit Date: 2017  3:44 PM   NAME: Jojo Graham   :  4/3/1931   MRN:  [id number]   Attending: Mónica Hurd MD  PCP:  Silverio Howell MD  Treatment Team: Attending Provider: Mónica Hurd MD; Utilization Review: Michaela Vivar RN  SUBJECTIVE:       Ms. Shabana Beltre is a 79 yo female living independently, PMH of afib, admitted with UTI/sepsis/afib with RVR that has resolved, day 3 merrem , ucx shows Violeta Fruit- however patient states antibiotic intolerances to all oral /downgrade options. Discussed with pharmacy and will try macrobid. BC pending. CXR negative. Will need SW for dispo         7-9 sitter at bedside, not sure about rehab but social situation seems that she has guardianship coverage, has chronic fatigue since , no dyspnea, some cough, no constipation, some abd sorenss       Recent Results (from the past 24 hour(s))   PLEASE READ & DOCUMENT PPD TEST IN 24 HRS    Collection Time: 17  8:19 PM   Result Value Ref Range    PPD negative Negative    mm Induration 0 mm   CBC WITH AUTOMATED DIFF    Collection Time: 17  4:56 AM   Result Value Ref Range    WBC 10.0 4.3 - 11.1 K/uL    RBC 4.68 4.05 - 5.25 M/uL    HGB 15.0 11.7 - 15.4 g/dL    HCT 45.5 35.8 - 46.3 %    MCV 97.2 79.6 - 97.8 FL    MCH 32.1 26.1 - 32.9 PG    MCHC 33.0 31.4 - 35.0 g/dL    RDW 15.4 (H) 11.9 - 14.6 %    PLATELET 278 (L) 247 - 450 K/uL    MPV 12.3 10.8 - 14.1 FL    DF AUTOMATED      NEUTROPHILS 81 (H) 43 - 78 %    LYMPHOCYTES 9 (L) 13 - 44 %    MONOCYTES 9 4.0 - 12.0 %    EOSINOPHILS 1 0.5 - 7.8 %    BASOPHILS 0 0.0 - 2.0 %    IMMATURE GRANULOCYTES 0.3 0.0 - 5.0 %    ABS. NEUTROPHILS 8.1 1.7 - 8.2 K/UL    ABS. LYMPHOCYTES 0.9 0.5 - 4.6 K/UL    ABS. MONOCYTES 0.9 0.1 - 1.3 K/UL    ABS. EOSINOPHILS 0.1 0.0 - 0.8 K/UL    ABS. BASOPHILS 0.0 0.0 - 0.2 K/UL    ABS. IMM.  GRANS. 0.0 0.0 - 0.5 K/UL   METABOLIC PANEL, BASIC    Collection Time: 17 4:56 AM   Result Value Ref Range    Sodium 145 136 - 145 mmol/L    Potassium 4.1 3.5 - 5.1 mmol/L    Chloride 110 (H) 98 - 107 mmol/L    CO2 26 21 - 32 mmol/L    Anion gap 9 7 - 16 mmol/L    Glucose 109 (H) 65 - 100 mg/dL    BUN 13 8 - 23 MG/DL    Creatinine 0.89 0.6 - 1.0 MG/DL    GFR est AA >60 >60 ml/min/1.73m2    GFR est non-AA >60 >60 ml/min/1.73m2    Calcium 8.5 8.3 - 10.4 MG/DL       Allergies   Allergen Reactions    Candida Albicans Skin Test, Std Other (comments)     Sinus infection-dried fruits, anything made with fungus    Ciprofloxacin Swelling and Shortness of Breath     Lips swell/red face    Codeine Hives, Other (comments) and Shortness of Breath     Other reaction(s): Headache-I  Gives whelps    Erythromycin Shortness of Breath    Hydrocodone-Acetaminophen Hives, Other (comments) and Shortness of Breath     Gives whelps    Penicillins Hives    Propoxyphene Hives, Other (comments) and Shortness of Breath     WELTS    Sulfa (Sulfonamide Antibiotics) Swelling and Shortness of Breath     Swelling -mouth inside and out, rash    Cortisone Other (comments)     Flushed if taken in larger amounts    Darvon Compound-65 [Propoxyphene-Asa-Caffeine] Unknown (comments)    Homeopathic Products Other (comments)     Sx of sinus infection.-take off-pt does not know what this is    Quinolones Swelling     rash    Statins-Hmg-Coa Reductase Inhibitors Unknown (comments)     Current Facility-Administered Medications   Medication Dose Route Frequency Provider Last Rate Last Dose    metoprolol succinate (TOPROL-XL) XL tablet 50 mg  50 mg Oral DAILY Drea Sotelo MD   50 mg at 07/09/17 0551    benzocaine-menthol (CEPACOL) lozenge  1 Lozenge Oral Q2H PRN Tori Habermann, MD        PPD read reminder  1 Each Other Q24H Irina Fishman MD   1 Each at 07/08/17 2100    acetaminophen (TYLENOL) tablet 325 mg  325 mg Oral Q6H PRN Tori Habermann, MD   325 mg at 07/08/17 1956    ascorbic acid (vitamin C) (VITAMIN C) tablet 1,000 mg  1,000 mg Oral DAILY TANVIR Mejia   1,000 mg at 17 0935    docusate sodium (COLACE) capsule 100 mg  100 mg Oral BID TANVIR Mejia   100 mg at 17 9985    ferrous gluconate 324 mg (38 mg iron) tablet 1 Tab  1 Tab Oral DAILY TANVIR Mejia   1 Tab at 17 4603    memantine (NAMENDA) tablet 10 mg  10 mg Oral DAILY TANVIR Mejia   10 mg at 17 7207    levothyroxine (SYNTHROID) tablet 125 mcg  125 mcg Oral ACB TANVIR Mejia   125 mcg at 17 1196    sodium chloride (NS) flush 5 mL  5 mL InterCATHeter Q8H TANVIR Mejia   5 mL at 17 0940    sodium chloride (NS) flush 5-10 mL  5-10 mL InterCATHeter PRN TANVIR Mejia        meropenem [facility]) 500 mg in 0.9% sodium chloride (MBP/ADV) 50 mL MBP  500 mg IntraVENous Q8H Asuncion Lea  mL/hr at 17 0419 500 mg at 17 0419           Review of Systems as noted above in HPI   PHYSICAL EXAM     Visit Vitals    /69 (BP 1 Location: Left arm)    Pulse 99    Temp 98.2 °F (36.8 °C)    Resp 18    Ht 5' 6\" (1.676 m)    Wt 82.6 kg (182 lb)    SpO2 93%    BMI 29.38 kg/m2      Temp (24hrs), Av.5 °F (36.9 °C), Min:97.8 °F (36.6 °C), Max:99.8 °F (37.7 °C)    Oxygen Therapy  O2 Sat (%): 93 % (17 1157)  Pulse via Oximetry: 106 beats per minute (17 1715)  O2 Device: Room air (17 1908)    Intake/Output Summary (Last 24 hours) at 17 1345  Last data filed at 17 1005   Gross per 24 hour   Intake              240 ml   Output                0 ml   Net              240 ml      General: No acute distress,conversive  Lungs:  CTAB, good effort   Heart:  Regular rate and irregular rhythm, no murmur, no edema   Abdomen: Soft, nontender and nondistended, normal bowel sounds  Extremities: Warm and dry         DIAGNOSTIC STUDIES      Labs and Studies from previous 24 hours have been personally reviewed by myself           Full Code    ASSESSMENT / Bernice Atkinson 169 Problems    Diagnosis Date Noted    Sepsis ([facility] [address] 07/07/2017    UTI (urinary tract infection) 07/07/2017    Atrial fibrillation with controlled ventricular response ([facility] [address] 07/07/2017    Vascular dementia without behavioral disturbance 05/30/2017    FERNANDA (obstructive sleep apnea)      Dr Joanna Villatoro       -change to [facility] as only oral option  -afib rate controlled on lopressor, no anticoagulants   -fatigue seems like a chronic issue for her that may have a difficult fix  -SW for dispo,PT/OT, consider rehab    FEN:oral  DVT Prophylaxis:  SCD  Disposition:pending   Time spent with patient:  Care plan addressed:  Risk Assessment:  Signed By: Tami Caldera MD     July 9, 2017

## 2017-07-09 NOTE — PROGRESS NOTES
Assumed care of patient. Assessment completed and documented, see docflow. Patient awakens to voice. IVF infusing without difficulty. NAD noted at present. Respirations even and non labored. Bed alarm in place for patient safety. Patient able to verbalize needs. Call light within reach. Will continue to monitor.

## 2017-07-09 NOTE — PROGRESS NOTES
Pt heart rate increased to 119 A-fib. Called Dr. Cristo Florez. Increased Toprol to 50 mg. Will continue to monitor pt.

## 2017-07-10 PROBLEM — E03.9 ACQUIRED HYPOTHYROIDISM: Chronic | Status: ACTIVE | Noted: 2017-01-01

## 2017-07-10 PROBLEM — Z99.89 OSA ON CPAP: Chronic | Status: ACTIVE | Noted: 2017-01-01

## 2017-07-10 PROBLEM — G47.33 OSA ON CPAP: Chronic | Status: ACTIVE | Noted: 2017-01-01

## 2017-07-10 NOTE — PROGRESS NOTES
Assisted to BR. Voided dark yellow urine. Very talkative about relationship with son that lives in [place]. Discussed interests in genealogy. States fears that her son wants her dead to take her money. Sitter at bedside states that she has a  that is ensuring her money is being properly controlled. Dr. Nancy Smith in to see pt.

## 2017-07-10 NOTE — PROGRESS NOTES
Pt resting quietly in bed, HOB elevated. Pt A&Ox4 on RA. Lung sounds clear, S1/S2 audible, peripheral pulses palpable, extremities warm. Pt denies pain or SOB at this time. Call light in place, pt instructed to call for assistance as needed.

## 2017-07-10 NOTE — PROGRESS NOTES
Problem: Interdisciplinary Rounds  Goal: Interdisciplinary Rounds  Outcome: Progressing Towards Goal  Interdisciplinary team rounds were held 7/10/2017 with the following team members:Nursing and Occupational Therapy and the patient and . Plan of care discussed. See clinical pathway and/or care plan for interventions and desired outcomes.

## 2017-07-10 NOTE — PROGRESS NOTES
Mrs. Selma Rubinstein sitting on edge of bed eating breakfast tray. Alert, oriented in all spheres. On room air with some fine crackles posteriorly. States some yellow sputum production at times. Telemetry monitor in place with strial fibrillation and rate of 113. Some edema noted to b/l lower extremities; especially surrounding knees. Without pain or discomfort. No needs expressed at this time. Call light in lap and door open.

## 2017-07-10 NOTE — PROGRESS NOTES
LMSW spoke with son/guardian Abby Councilman by phone [phone] about pt care planning. He requested SNF referral to [facility] as pt has a previous stay there and did well with therapy. Referral sent and bed offer received. They will initiate insurance precert. Also spoke with Denzel Verito [phone] at bedside, she is pt's court appointed liaison who assists with coordinating pt care locally as son is in [place]. She has also spoken with son about care plan and request for referral to [facility]. Pt also has 2 pvt care givers Anabella Miles and Shashank Ryder that continue to stay with her here at the hospital during the day. Will finalize pt transition to rehab as precert received and pt is stable for discharge.

## 2017-07-10 NOTE — PROGRESS NOTES
Problem: Mobility Impaired (Adult and Pediatric)  Goal: *Therapy Goal (Edit Goal, Insert Text)  STG:  (1.)Ms. Swetha Royal will move from supine to sit and sit to supine , scoot up and down and roll side to side with CGA x 1 within 4 day(s). (2.)Ms. Swetha Royal will transfer from bed to chair and chair to bed with STAND BY ASSIST using the least restrictive device within 4 day(s). Goal updated: 7/10/17  (3.)Ms. Carlos will ambulate with STAND BY ASSIST for 250 feet with the least restrictive device within 4 day(s). Goal updated: 7/10/17    LTG:  (1.)Ms. Swetha Royal will move from supine to sit and sit to supine , scoot up and down and roll side to side in bed with MODIFIED INDEPENDENCE within 7 day(s). (2.)Ms. Swetha Royal will transfer from bed to chair and chair to bed with MODIFIED INDEPENDENCE using the least restrictive device within 7 day(s). (3.)Ms. Swetha Royal will ambulate with MODIFIED INDEPENDENCE for 300 feet with the least restrictive device within 7 day(s). ________________________________________________________________________________________________       PHYSICAL THERAPY: Daily Note, Treatment Day: 1st and AM 7/10/2017  INPATIENT: Hospital Day: 4  Payor: Stephanie Cesar / Plan: [address] [place] [address] HMO / Product Type: Managed Care Medicare /      NAME/AGE/GENDER: Sophia Parker is a 80 y.o. female       PRIMARY DIAGNOSIS: Sepsis ([address] Sepsis ([facility] [address] Sepsis ([facility] [address]        ICD-10: Treatment Diagnosis:       · Generalized Muscle Weakness (M62.81)   Precaution/Allergies:  Candida albicans skin test, std; Ciprofloxacin; Codeine; Erythromycin; Hydrocodone-acetaminophen; Penicillins; Propoxyphene; Sulfa (sulfonamide antibiotics); Cortisone; Darvon compound-65 [propoxyphene-asa-caffeine];  Homeopathic products; Quinolones; and Statins-hmg-coa reductase inhibitors       ASSESSMENT:      Ms. Swetha Royal is a pleasant frail elderly female with above diagnosis who demonstrates with decreased transfers, ambulation and mobility below her prior functional baseline. She presents sitting up in recliner and agreeable to therapy. Stood with CGA to ambulate 250' in hallway with rollator walker with no losses of balance. Only requiring CGA/SBA for ambulation this PM. Returned to room to sit at edge of bed and returned to supine with supervision. Tearful at the end of the session and requested a prayer. Caregiver present for entire session. She has met some of her goals and they have been updated to reflect her current level of functioning. Will continue therapy efforts. Zonia Devlin is currently functioning below her baseline and would benefit from skilled PT during acute care stay to maximize safety and independence with functional mobility. ?????? ? ? This section established at most recent assessment??????????   PROBLEM LIST (Impairments causing functional limitations):  1. Decreased Strength affecting function  2. Decreased Transfer Abilities  3. Decreased Ambulation Ability/Technique  4. Decreased Balance  5. Decreased Activity Tolerance  6. Decreased Pacing Skills  7. Increased Fatigue affecting function  8. Decreased Flexibility/Joint Mobility  9. Decreased [place] with Home Exercise Program  REHABILITATION POTENTIAL FOR STATED GOALS: GOOD  PLAN OF CARE:  INTERVENTIONS PLANNED: (Benefits and precautions of physical therapy have been discussed with the patient.)  1. balance exercise  2. bed mobility  3. family education  4. gait training  5. range of motion: active/assisted/passive  6. therapeutic activities  7. therapeutic exercise/strengthening  8. transfer training  9. Group exercise  FREQUENCY/DURATION: Follow patient 1-2 times per day/4-7 days per week for until goals are met in order to address above goals. RECOMMENDED REHABILITATION/EQUIPMENT: (at time of discharge pending progress):    To be determined based upon patient functional status at medical discharge.  _____________                   HISTORY: History of Present Injury/Illness (Reason for Referral):  Glenna Asif is a 80 y.o. female that presented to the ED with 1 week history of weakness, mild confusion, urinary incontinence, diaphoresis, nausea and lower abdominal pain. ED evaluation detailed below shows sepsis UTI. She initially had A-fib RVR which has improved with IVF hydration and Cardizem. Urine culture from 2/22/17 shows MDR Klebsiella oxytoca. The hospitalist have been asked to admit. Past Medical History/Comorbidities:   Ms. Dre Rene  has a past medical history of Abnormal cardiovascular function study; Abnormal EKG; Atrial dilatation, bilateral; Bradycardia (5/4/2016); Bruit (arterial) (5/4/2016); CAD (coronary artery disease) (5/27/2009); Cancer ([facility] [address] (Distant past); Carotid stenosis; Chest pain (5/4/2016); Chronic atrial fibrillation ([facility] [address] (1/24/2016); Chronic constipation (7/10/2012); Chronic interstitial cystitis (7/20/2014); Cyst of left kidney; Debility (7/20/2014); Diverticulosis; Edema (5/4/2016); Falls; GERD (gastroesophageal reflux disease) (11/20/2013); H/O hiatal hernia; Hemorrhage of rectum and anus (11/20/2013); Hip fracture ([facility] [address] History of colon polyps; History of diverticulitis; History of kidney infection; History of nephritis (5670-0576); History of stroke (01/2016); History of torn meniscus of left knee; HTN (hypertension) (5/27/2009); Hyperlipidemia, mixed; Hypothyroid (2000); LVH (left ventricular hypertrophy); Malaise and fatigue (5/4/2016); Melanoma in situ of face ([facility] [address] (08/2016); Mitral valve regurgitation; Myalgia (5/4/2016); Nephrolithiasis (1966); FERNANDA on CPAP (1998); S/P colon resection (7/20/2014); Schatzki's ring (6/16/2010); Skull fracture ([facility] [address] (1/24/2016); Sleep apnea (5/4/2016); Stroke (cerebrum) ([facility] [address] Subdural bleeding ([facility] [address] (1/24/2016); Subendocardial infarction, subsequent episode of care [facility]) (5/4/2016); Unstable gait (1/24/2016); Vertigo; Vitamin B 12 deficiency; and Yeast UTI (7/22/2014). She also has no past medical history of DEMENTIA. Ms. Jinny Yusuf  has a past surgical history that includes ptca (2007 OR 08); vascular surgery procedure unlist; orthopaedic; other surgical; other surgical; hip fracture tx (Right); colectomy (2/2014); orthopaedic (Right, 1999); orthopaedic (Right, 2014); appendectomy (1965); other surgical (1998); tubal ligation (1966); malignant skin lesion excision (Left, 9/2015); coronary stent placement (2007); cataract removal (2010); colonoscopy (2014); orthopaedic (1/2003); urological (2011); and gyn (12/2010). Social History/Living Environment:   Home Environment: Private residence  One/Two Story Residence: One story  Living Alone: Yes (Has caregivers 9 hours/day)  Support Systems: Other (comments) (caregivers)  Patient Expects to be Discharged to[de-identified] Unknown  Current DME Used/Available at Home: Lanelle Gould, rollator, Shower chair, Grab bars (raised toilet seat)  Tub or Shower Type: Tub/Shower combination  Prior Level of Function/Work/Activity:   Pt was functioning with supervision using a Rolator in home environment prior to this admission. Home Environment: Private residence  # Steps to Enter: 2  One/Two Story Residence: One story  Living Alone: No  Support Systems:  (24/7 sitters)  Patient Expects to be Discharged to[de-identified] Private residence (with 24/7 sitters, pt is unsafe to be left alone)  Current DME Used/Available at Home: Walker, rollator  Dominant Side:         RIGHT  Personal Factors:          Sex:  female        Age:  80 y.o.    Number of Personal Factors/Comorbidities that affect the Plan of Care: 1-2: MODERATE COMPLEXITY   EXAMINATION:   Most Recent Physical Functioning:   Gross Assessment:                  Posture:     Balance:  Sitting: Intact  Standing: Impaired  Standing - Static: Good  Standing - Dynamic : Fair Bed Mobility:  Sit to Supine: Supervision  Wheelchair Mobility:     Transfers:  Sit to Stand: Stand-by asssistance  Stand to Sit: Stand-by asssistance  Gait:     Base of Support: Center of gravity altered;Narrowed  Speed/Sherrill: Slow  Step Length: Right shortened;Left shortened  Stance: Right increased; Left increased  Gait Abnormalities: Decreased step clearance;Trunk sway increased  Distance (ft): 250 Feet (ft)  Assistive Device: Walker, rollator  Ambulation - Level of Assistance: Contact guard assistance;Stand-by asssistance  Interventions: Safety awareness training;Verbal cues; Visual/Demos       Body Structures Involved:  1. Bones  2. Muscles Body Functions Affected:  1. Neuromusculoskeletal  2. Movement Related  3. Skin Related  4. Metobolic/Endocrine Activities and Participation Affected:  1. Mobility   Number of elements that affect the Plan of Care: 3: MODERATE COMPLEXITY   CLINICAL PRESENTATION:   Presentation: Stable and uncomplicated: LOW COMPLEXITY   CLINICAL DECISION MAKIN[address] [place] [id number] AM-PAC 6 Clicks   Basic Mobility Inpatient Short Form  How much difficulty does the patient currently have. .. Unable A Lot A Little None   1. Turning over in bed (including adjusting bedclothes, sheets and blankets)? [ ] 1   [ ] 2   [X] 3   [ ] 4   2. Sitting down on and standing up from a chair with arms ( e.g., wheelchair, bedside commode, etc.)   [ ] 1   [ ] 2   [X] 3   [ ] 4   3. Moving from lying on back to sitting on the side of the bed? [ ] 1   [ ] 2   [X] 3   [ ] 4   How much help from another person does the patient currently need. .. Total A Lot A Little None   4. Moving to and from a bed to a chair (including a wheelchair)? [ ] 1   [ ] 2   [X] 3   [ ] 4   5. Need to walk in hospital room? [ ] 1   [X] 2   [ ] 3   [ ] 4   6. Climbing 3-5 steps with a railing? [X] 1   [ ] 2   [ ] 3   [ ] 4   © , Trustees of [address] [place] [id number], under license to [employer].  All rights reserved    Score:  Initial: 15 Most Recent: X (Date: -- )     Interpretation of Tool:  Represents activities that are increasingly more difficult (i.e. Bed mobility, Transfers, Gait). Score 24 23 22-20 19-15 14-10 9-7 6       Modifier CH CI CJ CK CL CM CN         · Mobility - Walking and Moving Around:               - CURRENT STATUS:    CK - 40%-59% impaired, limited or restricted               - GOAL STATUS:           CJ - 20%-39% impaired, limited or restricted               - D/C STATUS:                       ---------------To be determined---------------  Payor: [employer] MEDICARE / Plan: [address] [place] [address] HMO / Product Type: Managed Care Medicare /       Medical Necessity:     · Patient demonstrates good rehab potential due to higher previous functional level. Reason for Services/Other Comments:  · Patient continues to require skilled intervention due to medical complications. Use of outcome tool(s) and clinical judgement create a POC that gives a: Clear prediction of patient's progress: LOW COMPLEXITY                 TREATMENT:   (In addition to Assessment/Re-Assessment sessions the following treatments were rendered)   Pre-treatment Symptoms/Complaints:  0/10   Pain: Initial:   Pain Intensity 1: 0  Post Session:  0/10 no pain reported at this time. Therapeutic Activity: (    23 minutes): Therapeutic activities including Bed transfers, Chair transfers, Ambulation on level ground and standing balance activities to improve mobility, strength, balance, coordination and activity tolerance. Required minimal Safety awareness training;Verbal cues; Visual/Demos to promote static and dynamic balance in standing. Braces/Orthotics/Lines/Etc:   · O2 Device: Room air  Treatment/Session Assessment:    · Response to Treatment:  Improved balance today. Tolerated mobility well.   · Interdisciplinary Collaboration:  · Physical Therapist  · Registered Nurse  · After treatment position/precautions:  · Supine in bed, Bed alarm/tab alert on, Bed/Chair-wheels locked, Bed in low position, Caregiver at bedside, Call light within reach, RN notified and Side rails x 3  · Compliance with Program/Exercises: Will assess as treatment progresses. · Recommendations/Intent for next treatment session: \"Next visit will focus on advancements to more challenging activities, reduction in assistance provided and dynamic transfers, ambulation and mobility. \".   Total Treatment Duration:PT Patient Time In/Time Out  Time In: 9961  Time Out: Lissa [address], DPT

## 2017-07-10 NOTE — PROGRESS NOTES
Mrs. Selma Rubinstein resting quietly in bed with TV on. Has just finished dinner tray. Good appetite. States \"this morning I felt better but since you told me that my Macrodantin is only 50 mg I've been feeling worse. I need to take 100 mg Macrodantin instead. \" Without c/o pain; only states \"I just feel bad and I don't think my UTI is going away. \" Without further needs at this time. Call light in lap and door open.

## 2017-07-10 NOTE — PROGRESS NOTES
Hospitalist Progress Note    7/10/2017  11:32 AM  Admit Date: 2017  3:44 PM   NAME: Zonia Devlin   :  4/3/1931   MRN:  [id number]   Attending: Jocelyn Whipple MD  PCP:  Ryland Lake MD  Treatment Team: Attending Provider: Jocelyn Whipple MD; Utilization Review: Herminio Le RN  SUBJECTIVE:       Ms. Alexa Horne is a 79 yo female living independently, PMH of afib, admitted with UTI/sepsis/afib with RVR that has resolved, completed day 3 merrem , ucx shows [employer] Shirts- however patient states antibiotic intolerances to all oral /downgrade options. Changed to day 1 macrobid. BC NGTD. CXR negative.  Will need SW for dispo         7-10 sitter at bedside, ate ok, wants wax cleaned from ears, no dyspnea, has cough,    Recent Results (from the past 24 hour(s))   PLEASE READ & DOCUMENT PPD TEST IN 48 HRS    Collection Time: 17  9:30 PM   Result Value Ref Range    PPD  Negative Negative    mm Induration  0 mm       Allergies   Allergen Reactions    Candida Albicans Skin Test, Std Other (comments)     Sinus infection-dried fruits, anything made with fungus    Ciprofloxacin Swelling and Shortness of Breath     Lips swell/red face    Codeine Hives, Other (comments) and Shortness of Breath     Other reaction(s): Headache-I  Gives whelps    Erythromycin Shortness of Breath    Hydrocodone-Acetaminophen Hives, Other (comments) and Shortness of Breath     Gives whelps    Penicillins Hives    Propoxyphene Hives, Other (comments) and Shortness of Breath     WELTS    Sulfa (Sulfonamide Antibiotics) Swelling and Shortness of Breath     Swelling -mouth inside and out, rash    Cortisone Other (comments)     Flushed if taken in larger amounts    Darvon Compound-65 [Propoxyphene-Asa-Caffeine] Unknown (comments)    Homeopathic Products Other (comments)     Sx of sinus infection.-take off-pt does not know what this is    Quinolones Swelling     rash    Statins-Hmg-Coa Reductase Inhibitors Unknown (comments)     Current Facility-Administered Medications   Medication Dose Route Frequency Provider Last Rate Last Dose    metoprolol succinate (TOPROL-XL) XL tablet 50 mg  50 mg Oral DAILY Elvira Morrow MD   50 mg at 07/10/17 0754    nitrofurantoin (MACRODANTIN) capsule 50 mg  50 mg Oral Q6H Corine Bergman MD   50 mg at 07/10/17 0517    benzocaine-menthol (CEPACOL) lozenge  1 Lozenge Oral Q2H PRN Corine Bergman MD        PPD read reminder  1 Each Other Q24H Melissa Newman MD   1 Each at 17 2100    acetaminophen (TYLENOL) tablet 325 mg  325 mg Oral Q6H PRN Corine Bergman MD   325 mg at 17 1956    ascorbic acid (vitamin C) (VITAMIN C) tablet 1,000 mg  1,000 mg Oral DAILY Bentley Left., PA   1,000 mg at 07/10/17 0754    docusate sodium (COLACE) capsule 100 mg  100 mg Oral BID Bentley Left., PA   100 mg at 07/10/17 6904    ferrous gluconate 324 mg (38 mg iron) tablet 1 Tab  1 Tab Oral DAILY Bentley Left., PA   1 Tab at 07/10/17 0755    memantine (NAMENDA) tablet 10 mg  10 mg Oral DAILY Bentley Left., PA   10 mg at 07/10/17 0755    levothyroxine (SYNTHROID) tablet 125 mcg  125 mcg Oral ACB Bentley Left., PA   125 mcg at 07/10/17 0518    sodium chloride (NS) flush 5 mL  5 mL InterCATHeter Desirae Spanner., PA   5 mL at 07/10/17 0517    sodium chloride (NS) flush 5-10 mL  5-10 mL InterCATHeter PRN Bentley Left., PA               Review of Systems as noted above in HPI   PHYSICAL EXAM     Visit Vitals    BP (!) 163/113 (BP 1 Location: Left arm)    Pulse 95    Temp 97.6 °F (36.4 °C)    Resp 18    Ht 5' 6\" (1.676 m)    Wt 82.6 kg (182 lb)    SpO2 97%    BMI 29.38 kg/m2      Temp (24hrs), Av.4 °F (36.9 °C), Min:97.6 °F (36.4 °C), Max:98.9 °F (37.2 °C)    Oxygen Therapy  O2 Sat (%): 97 % (07/10/17 0751)  Pulse via Oximetry: 106 beats per minute (17 1715)  O2 Device: Room air (07/10/17 0234)    Intake/Output Summary (Last 24 hours) at 07/10/17 1132  Last data filed at 07/10/17 1030   Gross per 24 hour   Intake              240 ml   Output                0 ml   Net              240 ml      General: No acute distress,conversive  Lungs:  CTAB, good effort   Heart:  Regular rate and irregular rhythm, no murmur, trace edema   Abdomen: Soft, nontender and nondistended, normal bowel sounds  Extremities: Warm and dry         DIAGNOSTIC STUDIES      Labs and Studies from previous 24 hours have been personally reviewed by myself           Full Code    ASSESSMENT / Bernice Atkinson 169 Problems    Diagnosis Date Noted    Sepsis ([facility] 07/07/2017    UTI (urinary tract infection) 07/07/2017    Atrial fibrillation with controlled ventricular response ([facility] [facility] 07/07/2017    Vascular dementia without behavioral disturbance 05/30/2017    FERNANDA (obstructive sleep apnea)      Dr Isaac Urbina       -continue macrodantin day 4/7 antibiotics, only oral option  -afib rate controlled on lopressor, no anticoagulants   -fatigue seems like a chronic issue for her that may have a difficult fix, needs rehab  - for dispo,PT/OT    FEN:oral  DVT Prophylaxis:  SCD  Disposition:pending   Time spent with patient:  Care plan addressed:  Risk Assessment:  Signed By: Tamica Herman MD     July 10, 2017

## 2017-07-10 NOTE — PROGRESS NOTES
Problem: Self Care Deficits Care Plan (Adult)  Goal: *Acute Goals and Plan of Care (Insert Text)  1. Ana Gillespie will be modified independent with functional mobility for ADL within 4 - 7 visits. Sixto Washington will be modified independent with total body bathing and dressing within 4 - 7 visits. 3. Ana Gillespie will state and demonstrate at least 5 energy conservation techniques during ADL/therapeutic activities within 4 - 7 visits. Sixto Washington will voice a plan for 2-3 appropriate home modifications for home safety and fall prevention within 7 visits. Sixto Washington will participate in at least 30 minutes of ADL with 3 or less rest breaks within 7 visits. Sixto Washington will complete a tub transfer with modified independence within 7 visits. OCCUPATIONAL THERAPY: Initial Assessment 7/10/2017  INPATIENT: Hospital Day: 4  Payor: Broderick Degroot / Plan: [address] [place] [address] HMO / Product Type: Managed Care Medicare /      NAME/AGE/GENDER: Ana Gillespie is a 80 y.o. female       PRIMARY DIAGNOSIS:  Sepsis ([facility] Sepsis ([facility] [address] [facility] Sepsis ([facility] [address]        ICD-10: Treatment Diagnosis:        · Generalized Muscle Weakness (M62.81)   Precautions/Allergies:         Candida albicans skin test, std; Ciprofloxacin; Codeine; Erythromycin; Hydrocodone-acetaminophen; Penicillins; Propoxyphene; Sulfa (sulfonamide antibiotics); Cortisone; Darvon compound-65 [propoxyphene-asa-caffeine]; Homeopathic products; Quinolones; and Statins-hmg-coa reductase inhibitors       ASSESSMENT:      Ms. Marycarmen Johnston presents secondary to the above and patient states she has had a UTI since last November. She presents with some unsteadiness on her feet and decreased activity tolerance.   Ana Gillespie presents with the following problems and would benefit from occupational therapy to maximize independence with self-care,instrumental activities of daily living, and functional transfers/mobility for activities of daily living/instrumental activities of daily living via the stated goals. This section established at most recent assessment   PROBLEM LIST (Impairments causing functional limitations):  1. Decreased Strength  2. Decreased ADL/Functional Activities  3. Decreased Transfer Abilities  4. Decreased Balance  5. Decreased Activity Tolerance  6. Decreased Work Simplification/Energy Conservation Techniques  7. Decreased Flexibility/Joint Mobility  8. Decreased [place] with Home Exercise Program  9. Decreased Cognition    INTERVENTIONS PLANNED: (Benefits and precautions of occupational therapy have been discussed with the patient.)  1. Activities of daily living training  2. Cognitive training  3. Therapeutic activity  4. Therapeutic exercise      TREATMENT PLAN: Frequency/Duration: Follow patient 3 times/week to address above goals. Rehabilitation Potential For Stated Goals: GOOD      RECOMMENDED REHABILITATION/EQUIPMENT: (at time of discharge pending progress): Continue Skilled Therapy. OCCUPATIONAL PROFILE AND HISTORY:   History of Present Injury/Illness (Reason for Referral):  Per MD H & P: Bernardo Shepherd is a 80 y.o. female that presented to the ED with 1 week history of weakness, mild confusion, urinary incontinence, diaphoresis, nausea and lower abdominal pain. ED evaluation detailed below shows sepsis UTI. She initially had A-fib RVR which has improved with IVF hydration and Cardizem. Urine culture from 2/22/17 shows MDR Klebsiella oxytoca. The hospitalist have been asked to admit. Past Medical History/Comorbidities:   Ms. Juan Puentes  has a past medical history of Abnormal cardiovascular function study; Abnormal EKG; Atrial dilatation, bilateral; Bradycardia (5/4/2016); Bruit (arterial) (5/4/2016); CAD (coronary artery disease) (5/27/2009); Cancer ([address] (Distant past); Carotid stenosis; Chest pain (5/4/2016); Chronic atrial fibrillation ([address] (1/24/2016);  Chronic constipation (7/10/2012); Chronic interstitial cystitis (7/20/2014); Cyst of left kidney; Debility (7/20/2014); Diverticulosis; Edema (5/4/2016); Falls; GERD (gastroesophageal reflux disease) (11/20/2013); H/O hiatal hernia; Hemorrhage of rectum and anus (11/20/2013); Hip fracture ([facility] History of colon polyps; History of diverticulitis; History of kidney infection; History of nephritis (2147-5872); History of stroke (01/2016); History of torn meniscus of left knee; HTN (hypertension) (5/27/2009); Hyperlipidemia, mixed; Hypothyroid (2000); LVH (left ventricular hypertrophy); Malaise and fatigue (5/4/2016); Melanoma in situ of face ([facility] [facility] (08/2016); Mitral valve regurgitation; Myalgia (5/4/2016); Nephrolithiasis (1966); FERNANDA on CPAP (1998); S/P colon resection (7/20/2014); Schatzki's ring (6/16/2010); Skull fracture ([facility] [facility] (1/24/2016); Sleep apnea (5/4/2016); Stroke (cerebrum) ([facility] [address] [facility] Subdural bleeding ([facility] [address] [facility] (1/24/2016); Subendocardial infarction, subsequent episode of care [facility]) (5/4/2016); Unstable gait (1/24/2016); Vertigo; Vitamin B 12 deficiency; and Yeast UTI (7/22/2014). She also has no past medical history of DEMENTIA. Ms. Darwin Gómez  has a past surgical history that includes ptca (2007 OR 08); vascular surgery procedure unlist; orthopaedic; other surgical; other surgical; hip fracture tx (Right); colectomy (2/2014); orthopaedic (Right, 1999); orthopaedic (Right, 2014); appendectomy (1965); other surgical (1998); tubal ligation (1966); malignant skin lesion excision (Left, 9/2015); coronary stent placement (2007); cataract removal (2010); colonoscopy (2014); orthopaedic (1/2003); urological (2011); and gyn (12/2010).   Social History/Living Environment:   Home Environment: Private residence  One/Two Story Residence: One story  Living Alone: Yes (Has caregivers 9 hours/day)  Support Systems: Other (comments) (caregivers)  Patient Expects to be Discharged to[de-identified] Unknown  Current DME Used/Available at Home: Esther Colbert, rollator, Shower chair, Grab bars (raised toilet seat)  Tub or Shower Type: Tub/Shower combination  Prior Level of Function/Work/Activity:  Lives alone, but has a \"court ordered\" aide there from 9 to 6 PM per patient. She receives some assistance for bathing, but does her own dressing. Uses a rollator. States she takes her own medications. Number of Personal Factors/Comorbidities that affect the Plan of Care: Brief history (0):  LOW COMPLEXITY   ASSESSMENT OF OCCUPATIONAL PERFORMANCE[de-identified]   Activities of Daily Living:          Basic ADLs (From Assessment) Complex ADLs (From Assessment)   Basic ADL  Feeding: Supervision  Oral Facial Hygiene/Grooming: Stand-by assistance  Bathing: Minimum assistance  Upper Body Dressing: Stand-by assistance  Lower Body Dressing: Minimum assistance  Toileting: Contact guard assistance Instrumental ADL  Meal Preparation: Moderate assistance  Homemaking:  Moderate assistance   Grooming/Bathing/Dressing Activities of Daily Living   Grooming  Brushing/Combing Hair: Stand-by assistance Cognitive Retraining  Safety/Judgement: Fall prevention                       Bed/Mat Mobility  Supine to Sit: Supervision  Sit to Stand: Contact guard assistance  Scooting: Supervision          Most Recent Physical Functioning:   Gross Assessment:  AROM: Generally decreased, functional  Strength: Generally decreased, functional               Posture:  Posture (WDL): Exceptions to WDL  Posture Assessment: Cervical, Forward head, Rounded shoulders  Balance:  Sitting: Intact  Standing: Impaired  Standing - Static: Constant support  Standing - Dynamic : Fair Bed Mobility:  Supine to Sit: Supervision  Scooting: Supervision  Wheelchair Mobility:     Transfers:  Sit to Stand: Contact guard assistance  Stand to Sit: Contact guard assistance      Vision:          Wears bifocals             Patient Vitals for the past 6 hrs:       BP SpO2 Pulse   07/10/17 0751 (!) 163/113 97 % 95        Mental Status  Neurologic State: Alert  Orientation Level: Oriented to person, Oriented to place (oriented to month)  Cognition: Follows commands  Perception: Appears intact  Perseveration: No perseveration noted  Safety/Judgement: Fall prevention                               Physical Skills Involved:  1. Range of Motion  2. Balance  3. Strength  4. Activity Tolerance Cognitive Skills Affected (resulting in the inability to perform in a timely and safe manner):  1. Executive Function Psychosocial Skills Affected:  1. Habits/Routines  2. Environmental Adaptation   Number of elements that affect the Plan of Care: 3-5:  MODERATE COMPLEXITY   CLINICAL DECISION MAKIN[address] [place] [address] AM-PAC 6 Clicks   Daily Activity Inpatient Short Form  How much help from another person does the patient currently need. .. Total A Lot A Little None   1. Putting on and taking off regular lower body clothing?   [ ] 1   [X] 2   [ ] 3   [ ] 4   2. Bathing (including washing, rinsing, drying)? [ ] 1   [X] 2   [ ] 3   [ ] 4   3. Toileting, which includes using toilet, bedpan or urinal?   [ ] 1   [X] 2   [ ] 3   [ ] 4   4. Putting on and taking off regular upper body clothing?   [ ] 1   [ ] 2   [X] 3   [ ] 4   5. Taking care of personal grooming such as brushing teeth? [ ] 1   [ ] 2   [X] 3   [ ] 4   6. Eating meals? [ ] 1   [ ] 2   [X] 3   [ ] 4   © , Trustees of [address] [place] [address], under license to [employer]. All rights reserved    Score:  Initial: 15 Most Recent: X (Date: -- )     Interpretation of Tool:  Represents activities that are increasingly more difficult (i.e. Bed mobility, Transfers, Gait).        Score 24 23 22-20 19-15 14-10 9-7 6       Modifier CH CI CJ CK CL CM CN         · Self Care:               - CURRENT STATUS:    CK - 40%-59% impaired, limited or restricted               - GOAL STATUS:           CI - 1%-19% impaired, limited or restricted               - D/C STATUS:                       ---------------To be determined---------------  Payor: Gabriela Tenorio / Plan: [address] [place] [address] HMO / Product Type: Managed Care Medicare /       Medical Necessity:     · Patient demonstrates fair rehab potential due to higher previous functional level. Reason for Services/Other Comments:  · Patient continues to require skilled intervention due to decreased independence with ADL/functional mobility for ADL. Use of outcome tool(s) and clinical judgement create a POC that gives a: LOW COMPLEXITY             TREATMENT:   (In addition to Assessment/Re-Assessment sessions the following treatments were rendered)      Pre-treatment Symptoms/Complaints:    Pain: Initial:   Pain Intensity 1:  (no complaints of pain)  Post Session:  same      Assessment/Reassessment only, no treatment provided today     Braces/Orthotics/Lines/Etc:   · IV  · O2 Device: Room air  Treatment/Session Assessment:    · Response to Treatment:  No treatment rendered. Assessment only. · Interdisciplinary Collaboration:  · Occupational Therapist  · Registered Nurse  · After treatment position/precautions:  · Up in chair  · Bed alarm/tab alert on  · Bed/Chair-wheels locked  · Call light within reach  · Compliance with Program/Exercises: Will assess as treatment progresses. · Recommendations/Intent for next treatment session: \"Next visit will focus on advancements to more challenging activities\".   Total Treatment Duration:  OT Patient Time In/Time Out  Time In: 1111  Time Out: 5281 Mary Mcnamara, OTTREVOR, OTR/L

## 2017-07-11 NOTE — PROGRESS NOTES
New orders received from Dr. Nathalia Newell for hydration prior to CTA scan per CT department protocol.

## 2017-07-11 NOTE — ACP (ADVANCE CARE PLANNING)
[facility] received a consult for assistance with the [address] [place] [address]; however, Spiritual Care will not proceed with the consult since patient has a legal permanent guardian. Patient's son Rod Haskins of [place] is identified in the medical record as guardian along with guardianship documents. The physician was planning to call patient's son Kiley Lang during my visit.      July Meza 68  Board Certified

## 2017-07-11 NOTE — PROGRESS NOTES
Called to room by CNA for concerns with patient. Upon arrival, patient assessed. Code S called per hospital policy.

## 2017-07-11 NOTE — PROGRESS NOTES
Responded to Code S called this morning. Ms. Jona Love received care from staff while I offered spiritual presence during the code. Patient's caregiver of two years Ms. Sujey Hwang arrived just as Ms. Jona Love was being wheeled out of her room for CT. I remained in the room with caregiver as she spoke with physician/staff. Offered spiritual interventions, including affirmation of emotions/jacob, exploration of coping skills/resources. Ms. Jona Love returned to room I continued support during teleneuro assessment and afterwards. Ms. Jona Love told me \"thank you\" at the close of the visit, as well as the caregiver. [place] [address] received a consult for assistance with the [address] [place] [address]; however, Spiritual Care will not proceed with the consult since patient has a legal permanent guardian. Patient's son Franco Kenyon of [place] is identified in the medical record as guardian along with guardianship documents. The physician was planning to call patient's son Sayda Gary during my visit.      July Sánchez 68  Board Certified

## 2017-07-11 NOTE — PROGRESS NOTES
Hospitalist Progress Note    2017  11:32 AM  Admit Date: 2017  3:44 PM   NAME: Connie Ferreira   :  4/3/1931   MRN:  [id number]   Attending: Codi Sotomayor MD  PCP:  Ana Villela MD  Treatment Team: Attending Provider: Codi Sotomayor MD; Utilization Review: Wilfredo Acosta RN  SUBJECTIVE:       Ms. Gerardo Castro is a 79 yo female living independently [address] of afib (not on AC due to fall and SDH in 2016), Dementia, GI Bleed admitted with UTI/sepsis/afib with RVR that has resolved, completed day 3 merrem, Ucx showed Henok Oh- however patient states antibiotic intolerances to all oral /downgrade options. Changed to macrobid. BC NGTD. CXR negative. : Code S called for unresponsiveness and Left facial droop. FS was 155. Pt awake but unable to talk. Has Left sided neglect and Right sided preference of gaze. Unable to follow any commands, moving arms spontaneously. Vitals ok. Caretaker at bedside states she had an episode on Friday at home with some left facial symptoms that resolved spontaneously. ROS: Unable to review 2/2 Acute Encephalopathy.     Recent Results (from the past 24 hour(s))   PLEASE READ & DOCUMENT PPD TEST IN 72 HRS    Collection Time: 07/10/17  9:30 PM   Result Value Ref Range    PPD  Negative    mm Induration  mm       Allergies   Allergen Reactions    Candida Albicans Skin Test, Std Other (comments)     Sinus infection-dried fruits, anything made with fungus    Ciprofloxacin Swelling and Shortness of Breath     Lips swell/red face    Codeine Hives, Other (comments) and Shortness of Breath     Other reaction(s): Headache-I  Gives whelps    Erythromycin Shortness of Breath    Hydrocodone-Acetaminophen Hives, Other (comments) and Shortness of Breath     Gives whelps    Penicillins Hives    Propoxyphene Hives, Other (comments) and Shortness of Breath     WELTS    Sulfa (Sulfonamide Antibiotics) Swelling and Shortness of Breath     Swelling -mouth inside and out, rash    Cortisone Other (comments)     Flushed if taken in larger amounts    Darvon Compound-65 [Propoxyphene-Asa-Caffeine] Unknown (comments)    Homeopathic Products Other (comments)     Sx of sinus infection.-take off-pt does not know what this is    Quinolones Swelling     rash    Statins-Hmg-Coa Reductase Inhibitors Unknown (comments)     Current Facility-Administered Medications   Medication Dose Route Frequency Provider Last Rate Last Dose    metoprolol succinate (TOPROL-XL) XL tablet 50 mg  50 mg Oral DAILY Lady Lanza MD   50 mg at 07/10/17 0754    nitrofurantoin (MACRODANTIN) capsule 50 mg  50 mg Oral Q6H Lea Graves MD   50 mg at 07/11/17 0505    benzocaine-menthol (CEPACOL) lozenge  1 Lozenge Oral Q2H PRN Selma Babinski, MD        acetaminophen (TYLENOL) tablet 325 mg  325 mg Oral Q6H PRN Selma Babinski, MD   325 mg at 07/08/17 1956    ascorbic acid (vitamin C) (VITAMIN C) tablet 1,000 mg  1,000 mg Oral DAILY TANVIR Wills   1,000 mg at 07/10/17 0754    docusate sodium (COLACE) capsule 100 mg  100 mg Oral BID TANVIR Wills   100 mg at 07/10/17 1753    ferrous gluconate 324 mg (38 mg iron) tablet 1 Tab  1 Tab Oral DAILY TANVIR Wills   1 Tab at 07/10/17 0755    memantine (NAMENDA) tablet 10 mg  10 mg Oral DAILY TANVIR Wills   10 mg at 07/10/17 0755    levothyroxine (SYNTHROID) tablet 125 mcg  125 mcg Oral ACB TANVIR Wills   125 mcg at 07/11/17 0459    sodium chloride (NS) flush 5 mL  5 mL InterCATHeter Q8H TANVIR Villegas   5 mL at 07/11/17 0458    sodium chloride (NS) flush 5-10 mL  5-10 mL InterCATHeter PRN TANVIR Wills               Review of Systems as noted above in HPI   PHYSICAL EXAM     Visit Vitals    /77    Pulse 95    Temp 97.6 °F (36.4 °C)    Resp 18    Ht 5' 6\" (1.676 m)    Wt 82.6 kg (182 lb)    SpO2 96%    BMI 29.38 kg/m2      Temp (24hrs), Av.9 °F (36.6 °C), Min:97.4 °F (36.3 °C), Max:98.5 °F (36.9 °C)    Oxygen Therapy  O2 Sat (%): 96 % (17 0920)  Pulse via Oximetry: 106 beats per minute (17 1715)  O2 Device: Room air (17 0740)    Intake/Output Summary (Last 24 hours) at 17 0941  Last data filed at 17 0807   Gross per 24 hour   Intake              620 ml   Output                0 ml   Net              620 ml      General: Awake, unable to follow commands  HEENT:  AT/NC, Left facial droop  Lungs:  CTAB, good effort   Heart:  Irregularly irregular rhythm, no murmur, trace edema   Abdomen: Soft, nontender and nondistended, normal bowel sounds  Extremities: Warm and dry   Neuro:   Rt sided geronimo neglect, Left gaze preference, Unable to speak or follow commands, prominent Left Facial droop. Skin:  No obvious Rash        DIAGNOSTIC STUDIES      Labs and Studies from previous 24 hours have been personally reviewed by myself           Full Code    ASSESSMENT / Bernice Atkinson 169 Problems    Diagnosis Date Noted    FERNANDA on CPAP 07/10/2017    Acquired hypothyroidism 07/10/2017    Sepsis ([facility] [address] 2017    UTI (urinary tract infection) 2017    Atrial fibrillation with controlled ventricular response ([facility] [address] 2017    Vascular dementia without behavioral disturbance 2017    HTN (hypertension) 2009       - Suspected Acute ischemic stroke: STAT CT head -ve and STAT tele neuro consult, On ASA, Statin. Poor candidate for tPA, referred to [address] for intervention. Ordered STAT CTA brain/Neck after discussion with Dr. Renato Sandoval. - E Coli UTI: Switch back to Meropenem day 5/7 antibiotics  - Chronic Afib:  rate controlled on lopressor, no anticoagulants due to falls/SDH. - Dementia  - Fatigue seems like a chronic issue for her that may have a difficult fix, needs rehab  - SW for dispo,PT/OT    FEN: SLP.    DVT Prophylaxis:  SCD  Disposition:pending improvement, will need placement, SW on board  Time spent in her care today: 60 mins  Critical care time spent was 35 mins  Care plan addressed: with caregiver and Guardian Son Josr Mcnally in [address] [facility]. Agrees to transfer to [facility] if needed. Risk Assessment: Very High Risk given likely Acute Ischemic Stroke, UTI with Sepsis.   Signed By: Amaury Acuña MD     July 11, 2017

## 2017-07-11 NOTE — DISCHARGE SUMMARY
Hospitalist Discharge Summary     Patient ID:  Bc Gutierrez  [id number]  39 y.o.  4/3/1931  Admit date: 7/7/2017  3:44 PM  Discharge date and time: 7/11/2017  Attending: Shaina Bazzi MD  PCP:  Chip Whipple MD  Treatment Team: Attending Provider: Shaina Bazzi MD; Utilization Review: Henry Mckeon RN    Principal Diagnosis Sepsis [facility])   Principal Problem:    Sepsis ([facility] [address] (7/7/2017)    Active Problems:    HTN (hypertension) (5/27/2009)      Vascular dementia without behavioral disturbance (5/30/2017)      UTI (urinary tract infection) (7/7/2017)      Atrial fibrillation with controlled ventricular response ([facility] [address] (7/7/2017)      FERNANDA on CPAP (7/10/2017)      Acquired hypothyroidism (7/10/2017)         Hospital Course: For details of presentation, please refer to admission H&P. Ms. Linh Alanis is a 79 yo female living independently [address] of afib (not on AC due to fall and SDH in 2016), Dementia, GI Bleed admitted with UTI/sepsis/afib with RVR that has resolved, Started on Meropenem for UTI. Urine Culture showed pansensitive ECOLI- however patient states antibiotic intolerances to all oral /downgrade options and Changed to macrobid. BC NGTD. CXR negative.         7/11/17: Code S called for unresponsiveness and Left facial droop. Glucose FS was 155. Pt awake but unable to talk. Has Right sided neglect and Left sided preference of gaze. Unable to follow any commands, moving arms spontaneously. Vitals ok. Caretaker at bedside states she had an episode on Friday at home with some left facial symptoms that resolved spontaneously. Seen by Tele Neuro and CT head done STAT that was stable with old infarct. Neuro recommended transfer to [facility] for intervention as she is poor candidate for tPA given history of ICH and SDH. STAT CTA brain and Neck ordered after discussion with Dr. Tex Love at [facility]. I spoke with her son Tatiana Robles in [facility] ([phone]).  He understood the need for and consented to transfer her to [address] [place] [address] for possible intervention. Her Echocardiogram showed normal EF and -ve for blood clots and bubble study. She was started on IV Fluids for low GFR and given IV contrast. Also restarted on IV Meropenem 1g q8h for 3 more days as she is unable to take oral meds this morning. This can be switched back to [facility] when she is able to take oral meds. She is medcially ready for transfer to [address] [place] [address] ER. Plan discussed with caregiver/family and all are in agreement with the plan. All questions answered. Pt was stable at time of discharge. Significant Diagnostic Imaging:     CT head:     FINDINGS: There is no acute intracranial hemorrhage, mass, or mass effect. No  abnormal extra-axial fluid collections identified. There is no hydrocephalus. The basilar cisterns are widely patent. Old encephalomalacia is again  appreciated in the left frontal lobe. Otherwise, the gray-white brain  parenchymal interface is well-maintained and unchanged from the prior exam. No  skull fracture or aggressive osseous lesion noted. The mastoid air cells and  included paranasal sinuses are clear.     IMPRESSION  IMPRESSION:   1. No acute intracranial abnormality. 2. 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 Labs: Results:       Chemistry Recent Labs      07/11/17   0929  07/09/17   0456   GLU  150*  109*   NA  143  145   K  3.5  4.1   CL  107  110*   CO2  25  26   BUN  15  13   CREA  0.98  0.89   CA  8.7  8.5   AGAP  11  9      CBC w/Diff Recent Labs      07/11/17   0929  07/09/17   0456   WBC  6.3  10.0   RBC  4.70  4.68   HGB  15.2  15.0   HCT  45.1  45.5   PLT  106*  114*   GRANS   --   81*   LYMPH   --   9*   EOS   --   1      Cardiac Enzymes Recent Labs      07/11/17   0929   CPK  107   CKND1  1.4      Coagulation No results for input(s): PTP, INR, APTT in the last 72 hours.     No lab exists for component: INREXT    Last A1c Lab Results   Component Value Date/Time    Hemoglobin A1c 6.1 01/25/2016 03:51 AM      Lipid Panel Lab Results   Component Value Date/Time    Cholesterol, total 138 05/11/2017 11:46 AM    HDL Cholesterol 42 05/11/2017 11:46 AM    LDL, calculated 70 05/11/2017 11:46 AM    VLDL, calculated 26 05/11/2017 11:46 AM    Triglyceride 131 05/11/2017 11:46 AM    CHOL/HDL Ratio 4.8 01/25/2016 03:51 AM      BNP No results for input(s): BNPP in the last 72 hours. Liver Enzymes No results for input(s): TP, ALB, TBIL, AP, SGOT, GPT in the last 72 hours. No lab exists for component: DBIL   Thyroid Studies Lab Results   Component Value Date/Time    T4, Total 7.6 03/28/2014 04:18 PM    T3 Uptake 35 05/23/2009 05:00 PM    TSH 0.827 05/11/2017 11:46 AM            Discharge Exam:  Patient Vitals for the past 24 hrs:   Temp Pulse Resp BP SpO2   07/11/17 0950 98.4 °F (36.9 °C) 95 18 140/88 97 %   07/11/17 0920 - 95 18 144/77 96 %   07/11/17 0733 97.6 °F (36.4 °C) 71 19 109/59 96 %   07/11/17 0315 98.5 °F (36.9 °C) 97 20 (!) 159/94 96 %   07/10/17 2300 98.2 °F (36.8 °C) 90 16 157/80 98 %   07/10/17 1910 98 °F (36.7 °C) 88 14 157/82 94 %   07/10/17 1515 97.7 °F (36.5 °C) 90 18 132/63 98 %     Visit Vitals    /88 (BP 1 Location: Left arm, BP Patient Position: Supine)  Comment (BP Patient Position): semi fowlers    Pulse 95    Temp 98.4 °F (36.9 °C)    Resp 18    Ht 5' 6\" (1.676 m)    Wt 82.6 kg (182 lb)    SpO2 97%    BMI 29.38 kg/m2     General:                    Awake, unable to follow commands  HEENT:                      AT/NC, Left facial droop  Lungs:                                 CTAB, good effort   Heart:                                  Irregularly irregular rhythm, no murmur, trace edema   Abdomen:                  Soft, nontender and nondistended, normal bowel sounds  Extremities:               Warm and dry   Neuro:                                             Rt sided geronimo neglect, Left gaze preference, Unable to speak or follow commands, prominent Left Facial droop. Skin:                                    No obvious Rash     Disposition: [facility]  Discharge Condition: stable  Patient Instructions: activity and diet as tolerated  Current Discharge Medication List      START taking these medications    Details   meropenem 1 g, ADDaptor 1 Device IVPB 1 g by IntraVENous route every eight (8) hours for 3 days. Qty: 9 Dose, Refills: 0         CONTINUE these medications which have NOT CHANGED    Details   acetaminophen (TYLENOL) 650 mg CR tablet Take  by mouth three (3) times daily. PHOS. SERINE/OMEGA-3/DHA/EPA (VAYACOG PO) Take 1 Tab by mouth daily. furosemide (LASIX) 20 mg tablet Take 1 Tab by mouth two (2) times a week. Take one pill on Monday and one pill on Thursday. Qty: 30 Tab, Refills: 3      Lactobacillus acidophilus (BACID) cap Take 2 Caps by mouth two (2) times a day. Qty: 120 Cap, Refills: 12      MYRBETRIQ 50 mg ER tablet Take 1 Tab by mouth daily. Indications: BLADDER HYPERACTIVITY, INCREASED URINARY FREQUENCY  Qty: 30 Tab, Refills: 12      estradiol (ESTRACE) 0.01 % (0.1 mg/gram) vaginal cream Insert 2 g into vagina every Monday and Friday. 2 ml per vagina  Twice a week. Qty: 42.5 g, Refills: 4    Associated Diagnoses: Postmenopausal atrophic vaginitis      Omega-3-DHA-EPA-Fish Oil 1,000 mg (120 mg-180 mg) cap Take 1 Tab by mouth daily. Associated Diagnoses: Recurrent UTI      cranberry 400 mg cap Take 1 Tab by mouth daily. Qty: 30 Cap, Refills: 6      levothyroxine (SYNTHROID) 125 mcg tablet Take 1 Tab by mouth Daily (before breakfast). Qty: 30 Tab, Refills: 11    Associated Diagnoses: Acquired hypothyroidism      memantine (NAMENDA) 10 mg tablet Take 1 Tab by mouth daily. Qty: 90 Tab, Refills: 4    Associated Diagnoses: Dementia without behavioral disturbance, unspecified dementia type      ferrous sulfate 325 mg (65 mg iron) cpER Take 1 Tab by mouth daily.   Qty: 90 Cap, Refills: 4    Associated Diagnoses: History of anemia      pyridoxine, vitamin B6, (VITAMIN B-6) 25 mg tablet Take 1 Tab by mouth daily. Qty: 90 Tab, Refills: 4    Associated Diagnoses: History of anemia      l-methylfolate (DEPLIN) 15 mg tablet Take 1 Tab by mouth daily. Qty: 30 Tab, Refills: 11      triamcinolone acetonide (KENALOG) 0.1 % topical cream Apply  to affected area two (2) times a day. use thin layer  Qty: 30 g, Refills: 1    Associated Diagnoses: Dermatitis      hydrocortisone (ANUSOL-HC) 2.5 % rectal cream Insert 1 Each into rectum three (3) times daily. Qty: 30 g, Refills: 4    Associated Diagnoses: Hemorrhoids, unspecified hemorrhoid type      metoprolol succinate (TOPROL-XL) 50 mg XL tablet Take 0.5 Tabs by mouth daily. Qty: 90 Tab, Refills: 3    Associated Diagnoses: Chronic atrial fibrillation (HCC)      atorvastatin (LIPITOR) 40 mg tablet Take 1 Tab by mouth daily. Qty: 90 Tab, Refills: 3      potassium chloride (K-DUR, KLOR-CON) 10 mEq tablet TAKE 1 TABLET BY MOUTH DAILY  Qty: 90 Tab, Refills: 3    Comments: **Patient requests 90 days supply**      carbamide peroxide (DEBROX) 6.5 % otic solution Administer 5 Drops into each ear two (2) times a day. ascorbic acid (VITAMIN C) 500 mg tablet Take 1,000 mg by mouth daily. therapeutic multivitamin (THERAGRAN) tablet Take 1 Tab by mouth daily. Qty: 90 Tab, Refills: 3      aspirin (ASPIRIN) 325 mg tablet Take 325 mg by mouth daily. VITAMIN B COMPLEX (B COMPLETE PO) Take 1 Tab by mouth daily. CALCIUM PHOSPHATE TRIB/VIT D3 (CITRACAL + D3, CALCIUM PHOS, PO) Take 1 Tab by mouth daily. diclofenac (VOLTAREN) 1 % gel Apply 2-4 g to affected area two (2) times a day. fluticasone (FLONASE) 50 mcg/actuation nasal spray 2 Sprays by Both Nostrils route daily. Qty: 1 Bottle, Refills: 11    Associated Diagnoses: Environmental allergies      cetirizine (ZYRTEC) 10 mg tablet Take 10 mg by mouth daily. polyethylene glycol (MIRALAX) 17 gram/dose powder Take 17 g by mouth daily.       docusate sodium (COLACE) 100 mg capsule Take 100 mg by mouth two (2) times a day. Syringe with Needle, Disp, 3 mL 23 x 1\" syrg 100 Box by Does Not Apply route every thirty (30) days. Qty: 100 Syringe, Refills: 5    Associated Diagnoses: Chronic fatigue             Vaccinations:   Pt screened by nursing for pneumococcal and influenza vaccination needs at discharge, will be ordered if needed and pt consented. Immunization History   Administered Date(s) Administered    Influenza Vaccine 02/15/2014, 10/11/2016    Influenza Vaccine PF 11/02/2005, 09/26/2008, 09/14/2009    Pneumococcal Polysaccharide (PPSV-23) 02/28/2014    Pneumococcal Vaccine (Unspecified Type) 12/10/2010    TB Skin Test (PPD) Intradermal 03/02/2014, 05/02/2014, 05/05/2014, 07/23/2014, 07/07/2017    Td 02/28/2014    Zoster Vaccine, Live 02/20/2007       Follow-up Information     Follow up With Details Comments [address], MD [address]  [address]  Internal Medicine and [address] [place] [address]  [phone]            Time spent in the discharge process was 35 minutes.       Signed By: Jewels Gomez MD     July 11, 2017

## 2017-07-11 NOTE — PROGRESS NOTES
Responded to [employer]". Pt is alert, follows commands, non verbal.  Pupils equal and deviate to the left. Left facial droop noted. RUE and RLE weakness noted. VSS. Accompanied patient to radiology for STAT CT head. Returned to pt room. /88, HR 92, a fib on tele monitor. Tele neurology consult completed with Dr Earnestine Mccord at bedside. Not a candidate for TPA, (prior hx ICH). Dr Harry Bennett will follow. Bedside update with Arthur Ledezma RN, will call if needed.

## 2017-07-11 NOTE — PROGRESS NOTES
Problem: Mobility Impaired (Adult and Pediatric)  Goal: *Therapy Goal (Edit Goal, Insert Text)  STG:  (1.)Ms. Conchis Thompson will move from supine to sit and sit to supine , scoot up and down and roll side to side with CGA x 1 within 4 day(s). (2.)Ms. Conchis Thompson will transfer from bed to chair and chair to bed with CONTACT GUARD ASSIST using the least restrictive device within 4 day(s). (3.)Ms. Carlos will ambulate with CONTACT GUARD ASSIST for 150 feet with the least restrictive device within 4 day(s). LTG:  (1.)Ms. Conchis Thompson will move from supine to sit and sit to supine , scoot up and down and roll side to side in bed with MODIFIED INDEPENDENCE within 7 day(s). (2.)Ms. Conchis Thompson will transfer from bed to chair and chair to bed with MODIFIED INDEPENDENCE using the least restrictive device within 7 day(s). (3.)Ms. Conchis Thompson will ambulate with MODIFIED INDEPENDENCE for 300 feet with the least restrictive device within 7 day(s). ________________________________________________________________________________________________   Appears patient had a sudden CVA this morning. Will discharge from our services until and if appropriate.      Hedy Spence PTA

## 2017-07-11 NOTE — PROGRESS NOTES
[facility] with primary RN, Micah Perez

## 2017-07-11 NOTE — PROGRESS NOTES
Report given to receiving nurse at [facility] ER at [phone]. EMS to deliver discharge instructions to facility. Instructed to call for any questions.

## 2017-07-12 NOTE — PROGRESS NOTES
Pt was transferred emergently to [facility] yesterday after a Code S event for treatment not available at [facility].  S transport came for pt. TONI completed, chart printed and disc of test sent with pt. MD spoke with son by phone about plan of care. Edgar Pinedo at bedside with pt. LMGALEN spoke with [employer] [phone] her locally appointed guardian to update her to plan of care as well.

## 2017-07-14 PROBLEM — I62.9 INTRACRANIAL HEMORRHAGE (HCC): Status: ACTIVE | Noted: 2017-01-01

## 2017-07-14 PROBLEM — I69.30 SEQUELA, POST-STROKE: Status: ACTIVE | Noted: 2017-01-01

## 2017-07-14 PROBLEM — I63.512 ACUTE ISCHEMIC LEFT MCA STROKE (HCC): Status: ACTIVE | Noted: 2017-01-01

## 2017-07-14 PROBLEM — R41.0 DELIRIUM: Status: ACTIVE | Noted: 2017-01-01

## 2017-07-14 PROBLEM — I63.9 STROKE (HCC): Status: ACTIVE | Noted: 2017-01-01

## 2017-07-15 NOTE — PROGRESS NOTES
Received report from off going RN and assumed care of patient. Patient identified by name and . Patient resting in bed with family at bed side. No s/sx of distress at this time time. Bed low and locked. Call light within reach. Door open for monitoring.

## 2017-07-15 NOTE — PROGRESS NOTES
Summary Note- Patient has required PRN SQ/IM medications for dyspnea, agitation, anxiety, and secretions/gurgling. No PO intake; mouth care only. Christianson to gravity; no bowel movement this shift. Patient safety maintained through hourly rounding: bed low and locked, side rails x2, tab alerts on, call light within reach, and door open for continuous monitoring.

## 2017-07-15 NOTE — PROGRESS NOTES
Patient arrived via EMS accompanied by family. Patient is unresponsive to verbal, tactile stimuli. Body assessment complete. Experiencing dyspnea; PRN SQ medication administered for symptoms. Pain per FLACC 0/10. Right side is flaccid. Christianson to gravity; last bowel movement unknown. Skin is intact. Family oriented to room, call light system, and facility. Blue book \"Gobe from my Sight\" given to family and instructed to ask any questions or voice concerns; verbalized understanding. Bed low and locked, side rails x2, call light within reach, and door open for continuous monitoring.

## 2017-07-16 NOTE — H&P
History and Physical    Patient: Chandu Parsons MRN: [id number]  SSN: [id number]    YOB: 1931  Age: 80 y.o. Sex: female      Subjective:      Chandu Parsons is a 80 y.o. female who has a hospice diagnosis of sequelae of stroke. Hospice associated diagnoses are acute left MCA ischemic infarct, remote subdural hematoma, remote intracranial hemorrhage, atrial fibrillation, remote left frontal lobe stroke and delirium. The patient has history of previous stroke, subdural hematoma and vascular dementia. On 7/11/2017, the patient was admitted to the hospital due to a massive left cerebral hemisphere ischemic infarct which resulted in aphasia and right hemiparesis. Her condition has not improved with IV fluids, enteral nutrition and medications for delirium. Her family has been advised by her physician that her prognosis is poor and that they should seek comfort care. Without further treatment, her life expectancy is less than 7 days. Due to her recent decline, her family has elected to forgo further medical treatment and pursue comfort measures with hospice care. Patient admitted GIP with sequelae of stroke for management of pain, dyspnea and delirium.       Past Medical History:   Diagnosis Date    Abnormal cardiovascular function study     Abnormal EKG     Acute encephalopathy 7/20/2014    Acute right-sided weakness 1/24/2016    ASCVD (arteriosclerotic cardiovascular disease) 5/30/2017    Atrial dilatation, bilateral     echo 7/11/2016    Bradycardia 5/4/2016    Bruit (arterial) 5/4/2016    CAD (coronary artery disease) 5/27/2009    S/p PTCA and stent to mid-lad (8/2008)     Cancer ([facility] [address] Distant past    skin ca on nose years ago    Carotid stenosis     Chest pain 5/4/2016    Chronic atrial fibrillation (HCC) 1/24/2016    PREVIOUSLY ON COUMADIN    Chronic constipation 7/10/2012    Chronic interstitial cystitis 7/20/2014    Followed by Dr. Krista Decker     Closed head injury 7/20/2014  Cyst of left kidney     Debility 7/20/2014    Diverticulosis     Dizziness 1/24/2016    Dysarthria 1/24/2016    Dysphagia 7/22/2014    Recommend puree/honey-thickened liquids. Patient will require 1:1 assistance with feeding. NO straws. RN notified. Speech Therapy to follow for diet tolerance, determine need for modified barium swallow study, and cognitive assessment.        Edema 5/4/2016    Elevated TSH 1/24/2016    Falls     GERD (gastroesophageal reflux disease) 11/20/2013    H/O hiatal hernia     Hemorrhage of rectum and anus 11/20/2013    RECTAL BLEEDING      Hip fracture (HCC)     History of colon polyps     History of diverticulitis     History of kidney infection     History of nephritis [id number]    [facility]    History of stroke 01/2016    left basal ganglia    History of torn meniscus of left knee     4/2010, Dr Anil Martinez    HTN (hypertension) 5/27/2009    Hyperlipidemia, mixed     Hypothyroid 2000    Interstitial cystitis     Dr Matilde Chavira LVH (left ventricular hypertrophy)     Last echo 7/11/2016,     Malaise and fatigue 5/4/2016    Malignant melanoma ([address] 8/6/2015    Melanoma in situ of face ([address] 08/2016    left cheek    Mitral valve regurgitation     echo done 7/11/2016, mild annular calcification    Myalgia 5/4/2016    Nephrolithiasis 1966    one kidney stone 1966-passed on own    OAB (overactive bladder) 5/30/2017    FERNANDA on CPAP 1998    Dr Pete Hernandez    Recurrent UTI 2/22/2017    S/P colon resection 7/20/2014 2/28/2014 s/p lap sigmoid resection for diverticular disease Dr. Nickolas Anderson's ring 6/16/2010    Skull fracture ([address] 1/24/2016 2014 2 TO FALL, CLOSED HEAD INJURY    Sleep apnea 5/4/2016    Stroke (cerebrum) ([address]     Subdural bleeding ([address] 1/24/2016 2014 DUE TO HEAD INJURY WITH SKULL FX    Subendocardial infarction, subsequent episode of care ([address] 5/4/2016    Unstable gait 1/24/2016    Vertigo     Vitamin B 12 deficiency     Yeast UTI 7/22/2014     Past Surgical History:   Procedure Laterality Date    HX APPENDECTOMY  1965    HX CATARACT REMOVAL  2010    bilat. Dr gil    HX COLECTOMY  2/2014    12\" removed    HX COLONOSCOPY  2014    Dr Sinan Thurman  2007    1 stent, D Siachos    HX GYN  12/2010    DR Metz, GYN biopsy    HX HIP FRACTURE TX Right     [place] 7[place]8/2014 - [place]    HX MALIGNANT SKIN LESION EXCISION Left 9/2015    cheek, MOHS, DR Wise Sprain    HX ORTHOPAEDIC      heel; finger    HX ORTHOPAEDIC Right 1999    foot, plantar fascitis nerve surgery, dr Claudette Covington Right 2014    hip    HX ORTHOPAEDIC  1/2003    Trigger finger release, Dr Tabitha Landon      biopsies, female    HX OTHER SURGICAL      Plantar Fasciitis    HX OTHER SURGICAL  1998    blayne edwards, Dr Chavez Mu HX PTCA  2007 OR 08    [facility] UROLOGICAL  2011    Cystoscopy, Dr Glass Fearing      vascular ligation-geraldo 1966-varicose veins      Family History   Problem Relation Age of Onset    Heart Disease Mother      HTN, LIPIDS    Hypertension Mother     High Cholesterol Mother     Heart Disease Father      LIPIDS    Hypertension Father     High Cholesterol Father     Stroke Father      x5    Cancer Sister     Cancer Brother     Diabetes Brother     Heart Disease Brother     Heart Disease Paternal Grandfather     Other Son      1 LOCAL     Social History   Substance Use Topics    Smoking status: Never Smoker    Smokeless tobacco: Never Used    Alcohol use No      Prior to Admission medications    Medication Sig Start Date End Date Taking? Authorizing Provider   calcium-cholecalciferol, D3, (CALTRATE 600+D) tablet Take 1 Tab by mouth daily. Yes Historical Provider   dilTIAZem CD (CARDIZEM CD) 120 mg ER capsule Take 120 mg by mouth daily.    Yes Historical Provider levothyroxine (SYNTHROID) 100 mcg tablet Take 100 mcg by mouth Daily (before breakfast). Yes Historical Provider   TRYPTOPHAN PO Take 2 Caps by mouth nightly. Yes Historical Provider   acetaminophen (TYLENOL) 325 mg tablet Take 650 mg by mouth every eight (8) hours as needed for Pain. Yes Historical Provider   metoprolol succinate (TOPROL-XL) 25 mg XL tablet Take 25 mg by mouth daily. Yes Historical Provider   diphenoxylate-atropine (LOMOTIL) 2.5-0.025 mg per tablet Take 1 Tab by mouth four (4) times daily as needed for Diarrhea. Yes Historical Provider   ibuprofen (MOTRIN) 800 mg tablet Take 800 mg by mouth every eight (8) hours as needed for Pain. Yes Historical Provider   naproxen sodium (ALEVE) 220 mg tablet Take 220 mg by mouth two (2) times daily (with meals). Yes Historical Provider   PHOS. SERINE/OMEGA-3/DHA/EPA (VAYACOG PO) Take 1 Tab by mouth daily. Yes Historical Provider   furosemide (LASIX) 20 mg tablet Take 1 Tab by mouth two (2) times a week. Take one pill on Monday and one pill on Thursday. Patient taking differently: Take 10 mg by mouth daily. 7/7/17  Yes Sean Pablo MD   estradiol (ESTRACE) 0.01 % (0.1 mg/gram) vaginal cream Insert 2 g into vagina every Monday and Friday. 2 ml per vagina  Twice a week. Patient taking differently: Insert 2 g into vagina daily as needed. 2 ml per vagina  Twice a week. Indications: ATROPHIC VAGINITIS ASSOCIATED WITH MENOPAUSE 5/12/17  Yes Melissa Fry NP   Wstyq-5-GFA-EPA-Fish Oil 1,000 mg (120 mg-180 mg) cap Take 1 Tab by mouth daily. Yes Historical Provider   ferrous sulfate 325 mg (65 mg iron) cpER Take 1 Tab by mouth daily. 2/9/17  Yes Melissa Fry NP   l-methylfolate (DEPLIN) 15 mg tablet Take 1 Tab by mouth daily. 1/18/17  Yes Melissa Fry NP   hydrocortisone (ANUSOL-HC) 2.5 % rectal cream Insert 1 Each into rectum three (3) times daily.   Patient taking differently: Insert 1 Each into rectum daily as needed for Hemorrhoids. 1/18/17  Yes Terence Weaver NP   atorvastatin (LIPITOR) 40 mg tablet Take 1 Tab by mouth daily. 12/14/16  Yes Stevan Chand MD   potassium chloride (K-DUR, KLOR-CON) 10 mEq tablet TAKE 1 TABLET BY MOUTH DAILY 12/14/16  Yes Stevan Chand MD   carbamide peroxide [place] [facility]) 6.5 % otic solution Administer 5 Drops into each ear daily as needed. Indications: IMPACTED CERUMEN   Yes Historical Provider   therapeutic multivitamin (THERAGRAN) tablet Take 1 Tab by mouth daily. 5/4/16  Yes Terence Weaver NP   aspirin (ASPIRIN) 325 mg tablet Take 325 mg by mouth daily. Yes Historical Provider   VITAMIN B COMPLEX (B COMPLETE PO) Take 1 Tab by mouth daily. Yes Historical Provider   diclofenac (VOLTAREN) 1 % gel Apply 2-4 g to affected area two (2) times daily as needed for Pain. Yes Historical Provider   cetirizine (ZYRTEC) 10 mg tablet Take 10 mg by mouth daily. Yes Historical Provider   polyethylene glycol (MIRALAX) 17 gram/dose powder Take 17 g by mouth daily. 12/21/15  Yes Historical Provider   docusate sodium (COLACE) 100 mg capsule Take 100 mg by mouth two (2) times a day. Yes Historical Provider   memantine (NAMENDA) 10 mg tablet Take 1 Tab by mouth daily. Patient taking differently: Take 10 mg by mouth nightly.  2/9/17   Terence Weaver NP        Allergies   Allergen Reactions    Candida Albicans Skin Test, Std Other (comments)     Sinus infection-dried fruits, anything made with fungus    Ciprofloxacin Swelling and Shortness of Breath     Lips swell/red face    Codeine Hives, Other (comments) and Shortness of Breath     Other reaction(s): Headache-I  Gives whelps    Erythromycin Shortness of Breath    Hydrocodone-Acetaminophen Hives, Other (comments) and Shortness of Breath     Gives whelps    Penicillins Hives    Propoxyphene Hives, Other (comments) and Shortness of Breath     WELTS    Sulfa (Sulfonamide Antibiotics) Swelling and Shortness of Breath     Swelling -mouth inside and out, rash    Cortisone Other (comments)     Flushed if taken in larger amounts    Homeopathic Drugs Unknown (comments)    Quinolones Swelling     rash    Statins-Hmg-Coa Reductase Inhibitors Unknown (comments)       Review of Systems:  Review of systems not obtained due to patient factors. Objective:     Vitals:    07/15/17 1646 07/15/17 1705 07/15/17 1811 07/15/17 1834   BP:       Pulse:       Resp: 24 18 26 26   Temp:            Physical Exam:  GENERAL: mild distress, appears stated age, pale  LUNG: Coarse breath sounds with labored respirations. HEART: irregularly irregular rhythm  ABDOMEN: soft, non-tender, distended. Bowel sounds normal. No masses,  no organomegaly  : Christianson catheter with dark yellow urine. EXTREMITIES:  extremities normal, atraumatic, no cyanosis. + pulses. Mild generalized edema. SKIN: Normal. and no rash or abnormalities. Cool to touch. NEUROLOGIC: Unresponsive. Nonverbal. Bedbound.  Unable to participate in exam.     Assessment:     Hospital Problems  Date Reviewed: 7/14/2017          Codes Class Noted POA    * (Principal)Sequela, post-stroke ICD-10-CM: I69.30  ICD-9-CM: 438.9  7/14/2017 Unknown        Acute ischemic left MCA stroke ([facility] [address] ICD-10-CM: Y25.528  ICD-9-CM: 434.91  7/14/2017 Unknown        Subdural hematoma ([facility] [address] ICD-10-CM: I62.00  ICD-9-CM: 432.1  5/4/2016 Yes    Overview Signed 5/4/2016  9:03 AM by Alda Navarro     After fall             Intracranial hemorrhage ([facility] [address] ICD-10-CM: I62.9  ICD-9-CM: 432.9  7/14/2017 Unknown    Overview Signed 7/14/2017 10:36 PM by Gerardo Victor NP     remote             Chronic atrial fibrillation (HCC) (Chronic) ICD-10-CM: O14.9  ICD-9-CM: 427.31  1/24/2016 Yes        Stroke [facility]) ICD-10-CM: I63.9  ICD-9-CM: 434.91  7/14/2017 Unknown    Overview Signed 7/14/2017 10:38 PM by Gerardo Victor NP     Remote left frontal lobe             Delirium ICD-10-CM: R41.0  ICD-9-CM: 780.09  7/14/2017 Unknown              Plan: Current Facility-Administered Medications   Medication Dose Route Frequency    morphine injection 2 mg  2 mg SubCUTAneous Q20MIN PRN    haloperidol lactate (HALDOL) injection 2 mg  2 mg SubCUTAneous Q1H PRN    acetaminophen (TYLENOL) suppository 650 mg  650 mg Rectal Q3H PRN    bisacodyl (DULCOLAX) suppository 10 mg  10 mg Rectal PRN    albuterol-ipratropium (DUO-NEB) 2.5 MG-0.5 MG/3 ML  3 mL Nebulization Q4H PRN    glycopyrrolate (ROBINUL) injection 0.2 mg  0.2 mg SubCUTAneous Q4H PRN    LORazepam (ATIVAN) injection 2 mg  2 mg IntraMUSCular Q10MIN PRN    LORazepam (ATIVAN) injection 1 mg  1 mg IntraMUSCular Q4H PRN       Admitted GIP with sequelae of stroke for management of pain, dyspnea and delirium. 1. Pain: Morphine as ordered. 2. Dyspnea: Morphine as ordered. Nebulizers prn. Glycopyrrolate prn secretions. Oxygen prn.    3. Delirium: Haldol as ordered. Lorazepam as ordered. 4. Family/Pt Support: Family at bedside during exam. Medications and plan of care discussed with nursing staff and family. Will continue to monitor for symptoms and adjust medications as needed to maintain patient comfort. PPS 10%. Case discussed with Dr. Ryan Vizcaino.     Signed By: Laila Mix NP     July 15, 2017

## 2017-07-16 NOTE — PROGRESS NOTES
Shift summary- Patient required PRN Morphine x 3 for dyspnea during this shift. Patient continues to be unresponsive. 0 BM. Christianson to gravity.

## 2017-07-16 NOTE — ROUTINE PROCESS
Pt I'd by name and . Pt non responsive. No distress. No facial grimace. Flacc =0-1. Resp 22 pm non labored on RA. Lungs diminished. No BS noted at this time. HR irreg. No edema noted at this time. Christianson cath draining  saad urine. No mottling noted at this time. SR up x2. Bed low/locked. Call light with in reach. Door opened.

## 2017-07-16 NOTE — PROGRESS NOTES
Summary Note- Patient is unresponsive. Required PRN SQ/IM medications for dyspnea, agitation, and anxiety. No PO intake; mouth care only. Christianson to gravity; no bowel movement this shift; family refused digital exam and /or bisacodyl suppository. Patient safety maintained through hourly rounding: bed low and locked, side rails x2, tab alerts on, call light within reach, and door open for continuous monitoring. Paid sitter at bedside till 17:30.

## 2017-07-17 NOTE — ROUTINE PROCESS
Pt non responsive. No facial grimace. Flacc =0-1. Resp irreg, 22 pm on RA. No mottling noted at this time. SR up x2. Bed low/locked. Call light with in reach. Door opened.

## 2017-07-17 NOTE — [facility] IDG BEREAVEMENT NOTES
Patient death and family bereavement needs discussed at [address] [place] [address]. Bereavement risk factors indicate a bereavement risk score of LOW . Bereavement follow up to be provided accordingly.

## 2017-07-17 NOTE — ROUTINE PROCESS
Pt with labored  Irreg, resp 32 pm on RA. No facial grimace. Flacc =0-1 Morphine 2mg sq in right upper arm given.

## 2017-07-17 NOTE — ROUTINE PROCESS
Pt non responsive. No facial grimace. Flacc =0-1. Resp  32 pm, labored, irreg on RA. Morphine 2mg sq given in right upper arm. No mottling noted at this time. SR up x2. Bed low/locked. Call light with in reach. Door opened.

## 2017-07-17 NOTE — ROUTINE PROCESS
Pt's resp up to 32 pm on RA. Labored. No facial grimace noted. Flacc =0-1. Morphine 2mg sq given in left upper arm.

## 2017-07-17 NOTE — ROUTINE PROCESS
Pt with resp 28 pm on RA. Morphine 2mg sq given in left upper arm. SR up x2. Bed low/locked. Call light with in reach. Door opened.

## 2020-05-28 NOTE — ROUTINE PROCESS
Pt with out Resp, HR, B/P. Appropriate staff notified. Janaecat Rout and [employer] notified. They are not coming to [facility]. Both are calm. Pt has no meds to dispose of at this time. No personal belongings noted. Post mortem care completed. Gerardo Worrell notified. Awaiting arrival of [facility]. [facility] arrived at [phone]. Body removed by [employer] at [id number]. Speaking Coherently no